# Patient Record
Sex: FEMALE | Race: WHITE | NOT HISPANIC OR LATINO | Employment: OTHER | URBAN - METROPOLITAN AREA
[De-identification: names, ages, dates, MRNs, and addresses within clinical notes are randomized per-mention and may not be internally consistent; named-entity substitution may affect disease eponyms.]

---

## 2023-04-17 LAB
ALBUMIN SERPL BCP-MCNC: 4.3 G/DL (ref 3.5–5.2)
ALP SERPL-CCNC: 76 U/L (ref 55–135)
ALT SERPL W/O P-5'-P-CCNC: 18 U/L (ref 10–44)
ANION GAP SERPL CALC-SCNC: 12 MMOL/L (ref 8–16)
AST SERPL-CCNC: 19 U/L (ref 10–40)
BASOPHILS # BLD AUTO: 0 K/UL (ref 0–0.2)
BASOPHILS NFR BLD: 0 % (ref 0–1.9)
BILIRUB SERPL-MCNC: 0.3 MG/DL (ref 0.1–1)
BNP SERPL-MCNC: 12 PG/ML (ref 0–99)
BUN SERPL-MCNC: 18 MG/DL (ref 6–20)
CALCIUM SERPL-MCNC: 9.7 MG/DL (ref 8.7–10.5)
CHLORIDE SERPL-SCNC: 108 MMOL/L (ref 95–110)
CO2 SERPL-SCNC: 22 MMOL/L (ref 23–29)
CREAT SERPL-MCNC: 0.9 MG/DL (ref 0.5–1.4)
DIFFERENTIAL METHOD: NORMAL
EOSINOPHIL # BLD AUTO: 0 K/UL (ref 0–0.5)
EOSINOPHIL NFR BLD: 0 % (ref 0–8)
ERYTHROCYTE [DISTWIDTH] IN BLOOD BY AUTOMATED COUNT: 12.6 % (ref 11.5–14.5)
EST. GFR  (NO RACE VARIABLE): >60 ML/MIN/1.73 M^2
GLUCOSE SERPL-MCNC: 105 MG/DL (ref 70–110)
HCT VFR BLD AUTO: 38.8 % (ref 37–48.5)
HGB BLD-MCNC: 13 G/DL (ref 12–16)
IMM GRANULOCYTES # BLD AUTO: 0.02 K/UL (ref 0–0.04)
IMM GRANULOCYTES NFR BLD AUTO: 0.3 % (ref 0–0.5)
LYMPHOCYTES # BLD AUTO: 2.7 K/UL (ref 1–4.8)
LYMPHOCYTES NFR BLD: 35.1 % (ref 18–48)
MCH RBC QN AUTO: 28.3 PG (ref 27–31)
MCHC RBC AUTO-ENTMCNC: 33.5 G/DL (ref 32–36)
MCV RBC AUTO: 85 FL (ref 82–98)
MONOCYTES # BLD AUTO: 0.3 K/UL (ref 0.3–1)
MONOCYTES NFR BLD: 4.3 % (ref 4–15)
NEUTROPHILS # BLD AUTO: 4.6 K/UL (ref 1.8–7.7)
NEUTROPHILS NFR BLD: 60.3 % (ref 38–73)
NRBC BLD-RTO: 0 /100 WBC
PLATELET # BLD AUTO: 273 K/UL (ref 150–450)
PMV BLD AUTO: 9.4 FL (ref 9.2–12.9)
POTASSIUM SERPL-SCNC: 3.9 MMOL/L (ref 3.5–5.1)
PROT SERPL-MCNC: 7.5 G/DL (ref 6–8.4)
RBC # BLD AUTO: 4.59 M/UL (ref 4–5.4)
SODIUM SERPL-SCNC: 142 MMOL/L (ref 136–145)
TROPONIN I SERPL DL<=0.01 NG/ML-MCNC: <0.006 NG/ML (ref 0–0.03)
WBC # BLD AUTO: 7.63 K/UL (ref 3.9–12.7)

## 2023-04-17 PROCEDURE — 93010 EKG 12-LEAD: ICD-10-PCS | Mod: ,,, | Performed by: INTERNAL MEDICINE

## 2023-04-17 PROCEDURE — 93005 ELECTROCARDIOGRAM TRACING: CPT

## 2023-04-17 PROCEDURE — 93010 ELECTROCARDIOGRAM REPORT: CPT | Mod: ,,, | Performed by: INTERNAL MEDICINE

## 2023-04-17 PROCEDURE — 84484 ASSAY OF TROPONIN QUANT: CPT | Performed by: PHYSICIAN ASSISTANT

## 2023-04-17 PROCEDURE — 99285 EMERGENCY DEPT VISIT HI MDM: CPT | Mod: 25

## 2023-04-17 PROCEDURE — 85025 COMPLETE CBC W/AUTO DIFF WBC: CPT | Performed by: PHYSICIAN ASSISTANT

## 2023-04-17 PROCEDURE — 83880 ASSAY OF NATRIURETIC PEPTIDE: CPT | Performed by: PHYSICIAN ASSISTANT

## 2023-04-17 PROCEDURE — 25000003 PHARM REV CODE 250

## 2023-04-17 PROCEDURE — 80053 COMPREHEN METABOLIC PANEL: CPT | Performed by: PHYSICIAN ASSISTANT

## 2023-04-17 RX ORDER — ASPIRIN 325 MG
TABLET ORAL
Status: COMPLETED
Start: 2023-04-17 | End: 2023-04-17

## 2023-04-17 RX ORDER — ASPIRIN 325 MG
325 TABLET ORAL
Status: COMPLETED | OUTPATIENT
Start: 2023-04-17 | End: 2023-04-17

## 2023-04-17 RX ADMIN — ASPIRIN 325 MG ORAL TABLET 325 MG: 325 PILL ORAL at 08:04

## 2023-04-17 RX ADMIN — Medication 325 MG: at 08:04

## 2023-04-18 ENCOUNTER — HOSPITAL ENCOUNTER (EMERGENCY)
Facility: HOSPITAL | Age: 33
Discharge: HOME | End: 2023-04-18
Attending: EMERGENCY MEDICINE
Payer: COMMERCIAL

## 2023-04-18 VITALS
RESPIRATION RATE: 16 BRPM | HEART RATE: 83 BPM | WEIGHT: 243.19 LBS | SYSTOLIC BLOOD PRESSURE: 111 MMHG | TEMPERATURE: 99 F | DIASTOLIC BLOOD PRESSURE: 55 MMHG | BODY MASS INDEX: 38.09 KG/M2 | OXYGEN SATURATION: 100 %

## 2023-04-18 DIAGNOSIS — R07.9 CHEST PAIN: Primary | ICD-10-CM

## 2023-04-18 LAB
D DIMER PPP IA.FEU-MCNC: 0.23 MG/L FEU
TROPONIN I SERPL DL<=0.01 NG/ML-MCNC: <0.006 NG/ML (ref 0–0.03)

## 2023-04-18 PROCEDURE — 84484 ASSAY OF TROPONIN QUANT: CPT | Performed by: PHYSICIAN ASSISTANT

## 2023-04-18 PROCEDURE — 85379 FIBRIN DEGRADATION QUANT: CPT | Performed by: EMERGENCY MEDICINE

## 2023-04-18 PROCEDURE — 25500020 PHARM REV CODE 255: Performed by: EMERGENCY MEDICINE

## 2023-04-18 RX ADMIN — IOHEXOL 100 ML: 350 INJECTION, SOLUTION INTRAVENOUS at 02:04

## 2023-04-18 NOTE — FIRST PROVIDER EVALUATION
Emergency Department TeleTriage Encounter Note      CHIEF COMPLAINT    Chief Complaint   Patient presents with    Weakness     Recently saw MD to rule out PE. Followed up with cardioligist. Wore monitor for 10 days. Stress and echo scheduled. Weak and fatigued today. Also intermittent angina.        VITAL SIGNS   Initial Vitals [04/17/23 2030]   BP Pulse Resp Temp SpO2   (!) 168/92 103 20 98.2 °F (36.8 °C) 96 %      MAP       --            ALLERGIES    Review of patient's allergies indicates:   Allergen Reactions    [place] oil Anaphylaxis    Flexeril [cyclobenzaprine] Other (See Comments)    Hydrocodone-acetaminoph-supp11 Diarrhea       PROVIDER TRIAGE NOTE  31 yo F to the ED with CP, fatigue, and lightheadedness. Appears moderately uncomfortable on exam. BP is elevated.       ORDERS  Labs Reviewed   CBC W/ AUTO DIFFERENTIAL   COMPREHENSIVE METABOLIC PANEL   TROPONIN I   B-TYPE NATRIURETIC PEPTIDE       ED Orders (720h ago, onward)      Start Ordered     Status Ordering Provider    04/17/23 2346 04/17/23 2045  Troponin I #2  Once Timed         Ordered JUDY MANRIQUE    04/17/23 2100 04/17/23 2045  aspirin tablet 325 mg  ED 1 Time         Ordered JUDY MANRIQUE    04/17/23 2046 04/17/23 2045  Vital signs  Every 15 min         Ordered JUDY MANRIQUE    04/17/23 2046 04/17/23 2045  Cardiac Monitoring - Adult  Continuous        Comments: Notify Physician If:    Ordered JUDY MANRIQUE    04/17/23 2046 04/17/23 2045  Pulse Oximetry Continuous  Continuous         Ordered JUDY MANRIQUE    04/17/23 2046 04/17/23 2045  Diet NPO  Diet effective now         Ordered JUDY MANRIQUE    04/17/23 2046 04/17/23 2045  Saline lock IV  Once         Ordered JUDY AMNRIQUE    04/17/23 2046 04/17/23 2045  EKG 12-lead  Once        Comments: Do not perform if previously done during this visit/ triage    Ordered JUDY MANRIQUE    04/17/23 2046 04/17/23 2045  CBC auto differential  STAT         Ordered JUDY MANRIQUE  T.    04/17/23 2046 04/17/23 2045  Comprehensive metabolic panel  STAT         Ordered JUDY MANRIQUE T.    04/17/23 2046 04/17/23 2045  Troponin I #1  STAT         Ordered JUDY MANRIQUE T.    04/17/23 2046 04/17/23 2045  BNP  STAT         Ordered JUDY MANRIQUE.    04/17/23 2046 04/17/23 2045  X-Ray Chest AP Portable  1 time imaging         Ordered JUDY MANRIQUE.    04/17/23 2036 04/17/23 2036  EKG 12-lead (Chest Pain) Age >30  Once        Comments: If patient > 30 yrs.    Completed by MODESTA MAZARIEGOS on 4/17/2023 at  8:36 PM MARCELLE VEGA              Virtual Visit Note: The provider triage portion of this emergency department evaluation and documentation was performed via [employer], a HIPAA-compliant telemedicine application, in concert with a tele-presenter in the room. A face to face patient evaluation with one of my colleagues will occur once the patient is placed in an emergency department room.      DISCLAIMER: This note was prepared with [employer] voice recognition transcription software. Garbled syntax, mangled pronouns, and other bizarre constructions may be attributed to that software system.

## 2023-04-18 NOTE — ED PROVIDER NOTES
SCRIBE #1 NOTE: IVincent, am scribing for, and in the presence of, Aubrey Angelo MD. I have scribed the HPI, ROS, PE.    SCRIBE #2 NOTE: I, Theron Benson, am scribing for, and in the presence of,  Danny Hall Jr., MD. I have scribed the remaining portions of the note not scribed by Scribe #1.    History     Chief Complaint   Patient presents with    Weakness     Recently saw MD to rule out PE. Followed up with cardioligist. Wore monitor for 10 days. Stress and echo scheduled. Weak and fatigued today. Also intermittent angina.      Review of patient's allergies indicates:   Allergen Reactions    [place] oil Anaphylaxis    Flexeril [cyclobenzaprine] Other (See Comments)    Hydrocodone-acetaminoph-supp11 Diarrhea         History of Present Illness     HPI    4/18/2023, 1:42 AM  History obtained from the patient      History of Present Illness: Dhara Amezcua is a 32 y.o. female patient with a PMHx of angina, Primary hypercoagulable state who presents to the Emergency Department for evaluation of SOB which onset a month ago. Pt has been experiencing chest pain in addition to the SOB this past month. Today her symptoms began at 1 pm and heightened in severity at 7 pm before finally relieving prior to arrival to the ED. Pt describes feeling as if they were going to feint two times today as well as felt like she was unable to finish sentences without losing breath. Symptoms are intermittent and moderate in severity. No mitigating or exacerbating factors reported. Associated sxs include dizziness. Patient denies any, and all other sxs at this time. No prior Tx reported. Pt states they are not taking blood thinners. No further complaints or concerns at this time.       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; no h/o DVTs/PEs; strong family h/o DVTs; w/u resulted in MTFHR and prothrombin gene mutation    Gout     Hearing loss     Hypothyroidism     Hypothyroidism-2014    Myopia     Myopia of both eyes    Osteoarthrosis, unspecified whether generalized or localized     Other chronic nonalcoholic liver disease     :Fatty liver disease, nonalcoholic-2014 - seen on u/s; rhem in [place] reports that there was hepatocellular disease; hepatitis w/u is wnl    Polycystic ovaries     Primary hypercoagulable state     Prothrombin Gene Mutation-2017; no h/o DVTs/PEs; strong family h/o DVTs; w/u resulted in MTFHR and prothrombin gene mutation    Rheumatoid arthritis     Rheumatoid arthritis flare    Sicca syndrome     Sjogren's syndrome-2017       Past Surgical History:  Past Surgical History:   Procedure Laterality Date    BUNIONECTOMY      ELBOW SURGERY      KNEE SURGERY      SINUS SURGERY      WISDOM TOOTH EXTRACTION Bilateral          Family History:  No family history on file.    Social History:  Social History     Tobacco Use    Smoking status: Never    Smokeless tobacco: Never   Substance and Sexual Activity    Alcohol use: Not on file    Drug use: Not on file    Sexual activity: Not on file        Review of Systems     Review of Systems   Constitutional:  Negative for fever.   HENT:  Negative for sore throat.    Respiratory:  Positive for shortness of breath.    Cardiovascular:  Positive for chest pain.   Gastrointestinal:  Negative for nausea.   Genitourinary:  Negative for dysuria.   Musculoskeletal:  Negative for back pain.   Skin:  Negative for rash.   Neurological:  Positive for dizziness. Negative for weakness.   Hematological:  Does not bruise/bleed easily.   All other  systems reviewed and are negative.     Physical Exam     Initial Vitals [04/17/23 2030]   BP Pulse Resp Temp SpO2   (!) 168/92 103 20 98.2 °F (36.8 °C) 96 %      MAP       --          Physical Exam  Nursing Notes and Vital Signs Reviewed.  Constitutional: Patient is in no acute distress. Well-developed and well-nourished.  Head: Atraumatic. Normocephalic.  Eyes: PERRL. EOM intact. Conjunctivae are not pale. No scleral icterus.  ENT: Mucous membranes are moist. Oropharynx is clear and symmetric.    Neck: Supple. Full ROM. No lymphadenopathy.  Cardiovascular: Regular rate. Regular rhythm. No murmurs, rubs, or gallops. Distal pulses are 2+ and symmetric.  Pulmonary/Chest: No respiratory distress. Clear to auscultation bilaterally. No wheezing or rales.  Abdominal: Soft and non-distended.  There is no tenderness.  No rebound, guarding, or rigidity. Good bowel sounds.  Genitourinary: No CVA tenderness  Musculoskeletal: Moves all extremities. No obvious deformities. No edema. No calf tenderness.  Skin: Warm and dry.  Neurological:  Alert, awake, and appropriate.  Normal speech.  No acute focal neurological deficits are appreciated.  Psychiatric: Normal affect. Good eye contact. Appropriate in content.     ED Course   Procedures  ED Vital Signs:  Vitals:    04/17/23 2030 04/18/23 0131 04/18/23 0151 04/18/23 0230   BP: (!) 168/92 (!) 142/80 (!) 150/68 (!) 147/64   Pulse: 103 91 88 89   Resp: 20 16 12 18   Temp: 98.2 °F (36.8 °C)      TempSrc: Oral      SpO2: 96% 100% 100% 100%   Weight: 110.3 kg (243 lb 2.7 oz)       04/18/23 0330 04/18/23 0429   BP: (!) 116/58 (!) 111/55   Pulse: 83 83   Resp: 17 16   Temp:  98.5 °F (36.9 °C)   TempSrc:     SpO2: 97% 100%   Weight:         Abnormal Lab Results:  Labs Reviewed   COMPREHENSIVE METABOLIC PANEL - Abnormal; Notable for the following components:       Result Value    CO2 22 (*)     All other components within normal limits   CBC W/ AUTO DIFFERENTIAL   TROPONIN I   B-TYPE  NATRIURETIC PEPTIDE   TROPONIN I   D DIMER, QUANTITATIVE        All Lab Results:  Results for orders placed or performed during the hospital encounter of 04/18/23   CBC auto differential   Result Value Ref Range    WBC 7.63 3.90 - 12.70 K/uL    RBC 4.59 4.00 - 5.40 M/uL    Hemoglobin 13.0 12.0 - 16.0 g/dL    Hematocrit 38.8 37.0 - 48.5 %    MCV 85 82 - 98 fL    MCH 28.3 27.0 - 31.0 pg    MCHC 33.5 32.0 - 36.0 g/dL    RDW 12.6 11.5 - 14.5 %    Platelets 273 150 - 450 K/uL    MPV 9.4 9.2 - 12.9 fL    Immature Granulocytes 0.3 0.0 - 0.5 %    Gran # (ANC) 4.6 1.8 - 7.7 K/uL    Immature Grans (Abs) 0.02 0.00 - 0.04 K/uL    Lymph # 2.7 1.0 - 4.8 K/uL    Mono # 0.3 0.3 - 1.0 K/uL    Eos # 0.0 0.0 - 0.5 K/uL    Baso # 0.00 0.00 - 0.20 K/uL    nRBC 0 0 /100 WBC    Gran % 60.3 38.0 - 73.0 %    Lymph % 35.1 18.0 - 48.0 %    Mono % 4.3 4.0 - 15.0 %    Eosinophil % 0.0 0.0 - 8.0 %    Basophil % 0.0 0.0 - 1.9 %    Differential Method Automated    Comprehensive metabolic panel   Result Value Ref Range    Sodium 142 136 - 145 mmol/L    Potassium 3.9 3.5 - 5.1 mmol/L    Chloride 108 95 - 110 mmol/L    CO2 22 (L) 23 - 29 mmol/L    Glucose 105 70 - 110 mg/dL    BUN 18 6 - 20 mg/dL    Creatinine 0.9 0.5 - 1.4 mg/dL    Calcium 9.7 8.7 - 10.5 mg/dL    Total Protein 7.5 6.0 - 8.4 g/dL    Albumin 4.3 3.5 - 5.2 g/dL    Total Bilirubin 0.3 0.1 - 1.0 mg/dL    Alkaline Phosphatase 76 55 - 135 U/L    AST 19 10 - 40 U/L    ALT 18 10 - 44 U/L    Anion Gap 12 8 - 16 mmol/L    eGFR >60 >60 mL/min/1.73 m^2   Troponin I #1   Result Value Ref Range    Troponin I <0.006 0.000 - 0.026 ng/mL   BNP   Result Value Ref Range    BNP 12 0 - 99 pg/mL   Troponin I #2   Result Value Ref Range    Troponin I <0.006 0.000 - 0.026 ng/mL   D dimer, quantitative   Result Value Ref Range    D-Dimer 0.23 <0.50 mg/L FEU       Imaging Results:  Imaging Results              CTA Chest Non-Coronary (PE Studies) (In process)                      X-Ray Chest AP Portable  (Preliminary result)  Result time 04/18/23 01:35:14      Wet Read by Aubrey Angelo MD (04/18/23 01:35:14, [facility] - Emergency Dept., Emergency Medicine)    No acute fidnigns                                 4:14 AM: Per [facility] interpretation of CT angiography with IV contrast: Normal Chest CTA. No pulmonary embolism    The EKG was ordered, reviewed, and independently interpreted by the ED provider.  Interpretation time: 20:33  Rate: 95 BPM  Rhythm:  Sinus rhythm with short SD  Interpretation: No acute ST changes. No STEMI.  .           The Emergency Provider reviewed the vital signs and test results, which are outlined above.     ED Discussion     2:00 AM: Dr. Angelo transfers care of patient to Dr. Hall pending results.    4:15 AM: Reassessed pt at this time.   Discussed with pt all pertinent ED information and results. Discussed pt dx and plan of tx. Gave pt all f/u and return to the ED instructions. All questions and concerns were addressed at this time. Pt expresses understanding of information and instructions, and is comfortable with plan to discharge. Pt is stable for discharge.    I discussed with patient and/or family/caretaker that evaluation in the ED does not suggest any emergent or life threatening medical conditions requiring immediate intervention beyond what was provided in the ED, and I believe patient is safe for discharge.  Regardless, an unremarkable evaluation in the ED does not preclude the development or presence of a serious of life threatening condition. As such, patient was instructed to return immediately for any worsening or change in current symptoms.       Medical Decision Making:   History:   Old Medical Records: I decided to obtain old medical records.  Old Records Summarized: records from clinic visits and records from previous admission(s).       <> Summary of Records: Reviewed the patient's prior evaluation for chest pain.  Initial Assessment:   Patient presents with chest pain  history of clotting disorder.  She is tachycardic on arrival with complaints of chest heaviness.  Differential Diagnosis:   PE, ASCVD, NSTEMI, STEMI, chest pain, pneumonia  Clinical Tests:   Lab Tests: Ordered and Reviewed  Radiological Study: Ordered and Reviewed  Medical Tests: Ordered and Reviewed  ED Management:  Patient was evaluated history physical examination.  EKG was performed independently interpreted as normal sinus rhythm without STEMI.  Blood work was obtained with x2 negative cardiac enzymes.  She would a mild decrease in her CO2 at 22.  D-dimer was normal as was BNP and her CBC.  Chest x-ray was obtained which was read read as no acute findings.  This was a independent interpretation.  CTA was performed read by stat read.  No PE.  I discussed all findings with the patient as well as the plan of care.  Patient is stable safe for discharge.  Does not appear to be cardiac or a PE.  She feels much better with this appears to be mostly anxious.  Her pulses come down after learning of the negative CT angio.  Patient is stable safe for discharge in my opinion.  Advised close follow-up with primary care provider.  She verbalized agreement understanding with all instructions seems reliable.  She is safe for discharge in my opinion.         ED Medication(s):  Medications   aspirin tablet 325 mg (325 mg Oral Given 4/17/23 2055)   iohexoL (OMNIPAQUE 350) injection 100 mL (100 mLs Intravenous Given 4/18/23 0229)       Discharge Medication List as of 4/18/2023  4:17 AM           Follow-up Information       [facility].    Specialty: Nurse Practitioner  Contact information:  [address] [facility] [address]  [phone]                                 Scribe Attestation:   Scribe #1: I performed the above scribed service and the documentation accurately describes the services I performed. I attest to the accuracy of the note.     Attending:   Physician Attestation Statement for Scribe #1: I,  Aubrey Angelo MD, personally performed the services described in this documentation, as scribed by Vincent Varner, in my presence, and it is both accurate and complete.       Scribe Attestation:   Scribe #2: I performed the above scribed service and the documentation accurately describes the services I performed. I attest to the accuracy of the note.    Attending Attestation:           Physician Attestation for Scribe:    Physician Attestation Statement for Scribe #2: I, Danny Hall Jr., MD, reviewed documentation, as scribed by Theron Benson in my presence, and it is both accurate and complete. I also acknowledge and confirm the content of the note done by Scribe #1.         Clinical Impression       ICD-10-CM ICD-9-CM   1. Chest pain  R07.9 786.50             Danny Hall Jr., MD  04/18/23 0444

## 2023-04-18 NOTE — DISCHARGE INSTRUCTIONS
Your blood work and EKG did not show any damage to your heart.  Your CT angiogram chest is also negative.  Continue all home medications.  Use ibuprofen for pain and follow up with her doctor 1-2 days for re-.  Return as needed for any worsening symptoms, problems, questions concerns for evaluation

## 2024-02-08 NOTE — PROGRESS NOTES
Date of Service: 2/14/2024    Chief Complaint:   Dhara Amezcua is a 33 y.o. female presenting today due to abnormal findings on recent rightmammogram.    History of Present Illness:   Mrs. Dhara Amezcua presents on 02/14/24 due to abnormal findings (asymmetry) on her recent mammogram. Additional imaging has been recommended.  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; no h/o DVTs/PEs; strong family h/o DVTs; w/u resulted in MTFHR and prothrombin gene mutation    Family history of breast cancer 02/14/2024    Gout     Hearing loss     Hypothyroidism     Hypothyroidism-2014    Myopia     Myopia of both eyes    Osteoarthrosis, unspecified whether generalized or localized     Other chronic nonalcoholic liver disease     :Fatty liver disease, nonalcoholic-2014 - seen on u/s; rhem in [place] reports that there was hepatocellular disease; hepatitis w/u is wnl    Polycystic ovaries     Primary hypercoagulable state     Prothrombin Gene Mutation-2017; no h/o DVTs/PEs; strong family h/o DVTs; w/u resulted in MTFHR and prothrombin gene mutation    Rheumatoid arthritis     Rheumatoid arthritis flare    Sicca syndrome     Sjogren's syndrome-2017      Past Surgical History:   Procedure Laterality Date    BUNIONECTOMY      ELBOW SURGERY      KNEE SURGERY      SINUS SURGERY      WISDOM TOOTH EXTRACTION Bilateral         Current Outpatient Medications:     ALPRAZolam (XANAX) 0.5 MG tablet, Take 0.5 mg by mouth daily as needed for Anxiety., Disp: , Rfl:     azaTHIOprine (IMURAN) 50  mg Tab, Take 25 mg by mouth 2 (two) times a day., Disp: , Rfl:     busPIRone (BUSPAR) 15 MG tablet, Take 15 mg by mouth 2 (two) times daily., Disp: , Rfl:     dextroamphetamine-amphetamine (ADDERALL XR) 20 MG 24 hr capsule, Take 20 mg by mouth every morning., Disp: , Rfl:     hyoscyamine sulfate (HYOSYNE ORAL), Take by mouth., Disp: , Rfl:     lisdexamfetamine (VYVANSE) 70 MG capsule, Take 70 mg by mouth every morning., Disp: , Rfl:     medroxyPROGESTERone (PROVERA) 10 MG tablet, Take 1 tablet (10 mg total) by mouth once daily. Take 1 pill po daily x 10 days each month for 3 months. for 10 days, Disp: 10 tablet, Rfl: 2    vitamin D (VITAMIN D3) 1000 units Tab, Take 1,000 Units by mouth once daily., Disp: , Rfl:     vortioxetine (TRINTELLIX) 10 mg Tab, Take 10 mg by mouth., Disp: , Rfl:    Review of patient's allergies indicates:   Allergen Reactions    Codeine Diarrhea, Nausea And Vomiting, Other (See Comments), Palpitations, Shortness Of Breath and Tinitus    [place] oil Anaphylaxis    Acetaminophen      Other Reaction(s): sold sweats    Flexeril [cyclobenzaprine] Other (See Comments)    Hydrocodone-acetaminoph-supp11 Diarrhea      Social History     Tobacco Use    Smoking status: Never    Smokeless tobacco: Never   Substance Use Topics    Alcohol use: Not on file      Family History   Problem Relation Age of Onset    Breast cancer Mother         Review of Systems   Integumentary:  Negative for rash, redness, and swelling/thickening  Breast:   Negative for lump/mass in right breast     Physical Exam   Constitutional: She appears well-developed. She is cooperative.   HENT: Normocephalic.   Cardiovascular:  Normal rate and regular rhythm.            Pulmonary/Chest: She exhibits no tenderness and no bony tenderness.   Abdominal: Soft. Normal appearance.   Musculoskeletal: Intact; no deficits  Neurological: She is alert.   Skin: No rash noted.   Breast and Chest Wall Evaluation:  Right breast exhibits no mass, no  nipple discharge, no skin change and no tenderness.    Left breast exhibits no mass, no nipple discharge, no skin change and no tenderness.     Lymphadenopathy: No supraclavicular or axillary adenopathy.    MAMMOGRAM REPORT: 2/12/2024: near complete resolution of asymmetry with spot views; 1/31/24 Focal asymmetry of the lower right breast for which further mammographic evaluation to include true lateral and spot compression views are warranted.     ULTRASOUND REPORT: nothing suspicious seen    NOTE:::We viewed her films together at today's visit.  We discussed the multiple views obtained and the important findings.  Even benign changes were mentioned and her questions were answered.  She knows that she may receive a formal letter or report from the Radiologist.  She is to contact us if she has questions.    ASSESSMENT and PLAN OF CARE     1. Abnormality of right breast on screening mammogram  Assessment & Plan:  We discussed her right breast asymmetry at length. It almost completely resolved with spot views and was not seen with US.  She should require no further imaging.      2. Family history of breast cancer  Assessment & Plan:  We discussed her family history of length.  Her mother was over the age of 50 with her diagnosis.  Dhara knows that her risk of having a genetic mutation is only half that of her mother's.  Her mother's risk at this point is 2.2% and Dhara's is only 1%.  I would favor the mother being tested first and only testing Bita if her mother's  test comes back positive.  She agrees.      Medical Decision Making: It is my impression that this patient suffers all conditions contained in this medical document.  Each of these conditions did affect our plan of care and my medical decision making today.  It is my opinion that the medical decision making concerning this patient was of moderate difficulty based on the aforementioned conditions.  Any further recommendations will be  communicated to the patient by me.  I have reviewed and verified her allergies, list of medications, medical and surgical histories, social history, and a pertinent review of symptoms.     Follow up:  1 year and prn    For:  Physical Examination and MGM (D) at

## 2024-02-09 PROBLEM — R92.8 ABNORMALITY OF RIGHT BREAST ON SCREENING MAMMOGRAM: Status: ACTIVE | Noted: 2024-02-09

## 2024-02-09 NOTE — ASSESSMENT & PLAN NOTE
We discussed her right breast asymmetry at length. It almost completely resolved with spot views and was not seen with US.  She should require no further imaging.

## 2024-02-14 ENCOUNTER — OFFICE VISIT (OUTPATIENT)
Dept: SURGERY | Facility: CLINIC | Age: 34
End: 2024-02-14
Payer: COMMERCIAL

## 2024-02-14 DIAGNOSIS — Z80.3 FAMILY HISTORY OF BREAST CANCER: ICD-10-CM

## 2024-02-14 DIAGNOSIS — R92.8 ABNORMALITY OF RIGHT BREAST ON SCREENING MAMMOGRAM: Primary | ICD-10-CM

## 2024-02-14 PROCEDURE — 99999 PR PBB SHADOW E&M-EST. PATIENT-LVL III: CPT | Mod: PBBFAC,,, | Performed by: SPECIALIST

## 2024-02-14 PROCEDURE — 1159F MED LIST DOCD IN RCRD: CPT | Mod: CPTII,S$GLB,, | Performed by: SPECIALIST

## 2024-02-14 PROCEDURE — 99203 OFFICE O/P NEW LOW 30 MIN: CPT | Mod: S$GLB,,, | Performed by: SPECIALIST

## 2024-02-14 PROCEDURE — 1160F RVW MEDS BY RX/DR IN RCRD: CPT | Mod: CPTII,S$GLB,, | Performed by: SPECIALIST

## 2024-02-14 NOTE — ASSESSMENT & PLAN NOTE
We discussed her family history of length.  Her mother was over the age of 50 with her diagnosis.  Dhara knows that her risk of having a genetic mutation is only half that of her mother's.  Her mother's risk at this point is 2.2% and Dhara's is only 1%.  I would favor the mother being tested first and only testing Bita if her mother's  test comes back positive.  She agrees.

## 2024-04-16 ENCOUNTER — CLINICAL SUPPORT (OUTPATIENT)
Dept: AUDIOLOGY | Facility: CLINIC | Age: 34
End: 2024-04-16
Payer: COMMERCIAL

## 2024-04-16 ENCOUNTER — OFFICE VISIT (OUTPATIENT)
Dept: OTOLARYNGOLOGY | Facility: CLINIC | Age: 34
End: 2024-04-16
Payer: COMMERCIAL

## 2024-04-16 VITALS — BODY MASS INDEX: 39.64 KG/M2 | WEIGHT: 253.06 LBS

## 2024-04-16 DIAGNOSIS — H81.01 MENIERE DISEASE, RIGHT: Primary | ICD-10-CM

## 2024-04-16 PROCEDURE — 99204 OFFICE O/P NEW MOD 45 MIN: CPT | Mod: S$GLB,,, | Performed by: STUDENT IN AN ORGANIZED HEALTH CARE EDUCATION/TRAINING PROGRAM

## 2024-04-16 PROCEDURE — 99999 PR PBB SHADOW E&M-EST. PATIENT-LVL III: CPT | Mod: PBBFAC,,, | Performed by: STUDENT IN AN ORGANIZED HEALTH CARE EDUCATION/TRAINING PROGRAM

## 2024-04-16 PROCEDURE — 92567 TYMPANOMETRY: CPT | Mod: S$GLB,,, | Performed by: AUDIOLOGIST-HEARING AID FITTER

## 2024-04-16 PROCEDURE — 99999 PR PBB SHADOW E&M-EST. PATIENT-LVL I: CPT | Mod: PBBFAC,,, | Performed by: AUDIOLOGIST-HEARING AID FITTER

## 2024-04-16 PROCEDURE — 3008F BODY MASS INDEX DOCD: CPT | Mod: CPTII,S$GLB,, | Performed by: STUDENT IN AN ORGANIZED HEALTH CARE EDUCATION/TRAINING PROGRAM

## 2024-04-16 PROCEDURE — 1159F MED LIST DOCD IN RCRD: CPT | Mod: CPTII,S$GLB,, | Performed by: STUDENT IN AN ORGANIZED HEALTH CARE EDUCATION/TRAINING PROGRAM

## 2024-04-16 PROCEDURE — 92557 COMPREHENSIVE HEARING TEST: CPT | Mod: S$GLB,,, | Performed by: AUDIOLOGIST-HEARING AID FITTER

## 2024-04-16 NOTE — PROGRESS NOTES
Referring provider: Dr. Peter Amezcua was seen 04/16/2024 for an audiological evaluation.  Patient complains of episodic dizziness with associated decreased hearing, tinnitus and aural fullness in her right ear. In 2015 she had sudden right hearing loss, right-vision loss and right-sided facial numbness sensation. She had a negative MRI for vestibular schwannoma and was told likely viral. Symptoms self-resolved except that her hearing did not fully recover for the right ear. An audiogram in 2019 with [facility] indicated right hearing loss, describing around a flat 60-dB loss. A recent audiogram with [facility] indicated normal hearing AU. She wore Cristin in the right ear for 1-year when her hearing was poor, with [facility]. Patient has concern for Meniere's. She reports quarterly episodes of postural vertigo lasting for minutes that persists for about 10-15 days. She appreciates fluctuations in hearing and fluctuating aural fullness for her right ear. She endorses fluctuating tinnitus AD: non-pulsatile high-pitched ringing. No vestibular testing. She also has history of migraine with long history of motion-sickness. She had sinus surgery that helped with her migraines. She also got fitted with prism in her glasses that has notably improved her motion-sickness and migraine. Was on topomax but able to stop since getting prisms. Over the past one year, she made diet changes to eliminating processed foods, limits salt and caffeine, and switched to natural products. Overall symptoms have been better since her lifestyle change. Not on diuretic.     Results reveal normal hearing sensitivity 125-8000 Hz for the right ear, and normal hearing sensitivity 125-8000 Hz for the left ear.   Speech Reception Thresholds were 10 dBHL for the right ear and 10 dBHL for the left ear.   Word recognition scores were excellent for the right ear and excellent for the left ear.   Tympanograms were Type A for the right ear  and Type A for the left ear.    Patient was counseled on the above findings.    Recommendations:  ENT: Her symptoms are most consistent with meniere's disease. We discussed continued hydrops diet and testing with VNG/Ecog during next acute episode to establish the diagnosis. We did discuss abortive and maintenance treatment options if episodes become more severe or frequent despite hydrops diet.

## 2024-04-16 NOTE — PROGRESS NOTES
Chief complaint:   Chief Complaint   Patient presents with    Dizziness     10-15 days at a time for the last year + - is very postural in nature - has been increasing    Sinus Problem     History of sinus surgery - +PND    Hearing Loss     Right ear - seems to fluctuate        Referring Provider:  Self, Aaareferral  No address on file    History of Present Illness:     Ms. Amezcua is a 33 y.o. female presenting for evaluation of dizziness.      Patient complains of 2015 right hearing, vision loss, and unusual facial sensation. NL MRI at that time. Self-resolved except hearing did not fully recover.     Since then she has had some recurrent episodes of hearing, ear pressure, and dizziness (room spinning with turning to the right). Symptoms usually worse in the right ear.  Typically will last for 2 weeks. Have happened 3-4  times per year over thel ast 1.5 years. Associated non-pulsatile high-pitched ringing AD. No improvement with Epley. Last severe episode was     Has improved over last year with switching to more natural diet and limiting salt and caffeine.    2019 [facility] audio - right poorer than left. Recent audio [facility] - NL AU. Concern for Meniere's.      Prism in glasses - long history of motion-sickness and vertigo has notably improved with prism. Has migraine history. As on topomax but able to stop since getting prisms. Migraines have also improved.     Still has some random episodes of tingling and burning in the right face.     Chest tightness and SOB about 1 year - normal CV workup. Started better diet around that time.        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; no h/o DVTs/PEs; strong family h/o DVTs; w/u resulted in MTFHR and prothrombin gene mutation    Family history of breast cancer 02/14/2024    Gout     Hearing loss     Hypothyroidism     Hypothyroidism-2014    Myopia     Myopia of both eyes    Osteoarthrosis, unspecified whether generalized or localized     Other chronic nonalcoholic liver disease     :Fatty liver disease, nonalcoholic-2014 - seen on u/s; rhem in [place] reports that there was hepatocellular disease; hepatitis w/u is wnl    Polycystic ovaries     Primary hypercoagulable state     Prothrombin Gene Mutation-2017; no h/o DVTs/PEs; strong family h/o DVTs; w/u resulted in MTFHR and prothrombin gene mutation    Rheumatoid arthritis     Rheumatoid arthritis flare    Sicca syndrome     Sjogren's syndrome-2017    .          Past Surgical History:  Past Surgical History:   Procedure Laterality Date    BUNIONECTOMY      ELBOW SURGERY      KNEE SURGERY      SINUS SURGERY      WISDOM TOOTH EXTRACTION Bilateral    .         Medications: Medication list was reviewed. She  has a current medication list which includes the following prescription(s): alprazolam, azathioprine, buspirone, hyoscyamine sulfate, vitamin d, dextroamphetamine-amphetamine, lisdexamfetamine, medroxyprogesterone, and trintellix.         Allergies:   Review of patient's allergies indicates:   Allergen Reactions    Codeine Diarrhea, Nausea And Vomiting, Other (See Comments), Palpitations, Shortness Of Breath and Tinitus    McLean oil Anaphylaxis    Acetaminophen      Other Reaction(s): sold sweats    Flexeril [cyclobenzaprine] Other (See Comments)    Hydrocodone-acetaminoph-supp11 Diarrhea            Family history: family history includes Breast cancer in her mother.         Social History          Alcohol use:  has no history on file for alcohol use.            Tobacco:  reports that  she has never smoked. She has never used smokeless tobacco.         Please see the patient's intake form for full details of past medical history, past surgical history, family history, social history and review of systems. ?This information was reviewed by me and noted.      Physical Examination     There were no vitals filed for this visit.     General: Well developed, well nourished, well hydrated. Verbal with a strong voice and not dysphonic.     Head/Face: Normocephalic, atraumatic. No scars or lesions. Facial musculature equal.     Eyes: No scleral icterus or conjunctival hemorrhage. EOMI. PERRLA.     Ears:     Right ear: No gross deformity. EAC is clear of debris and erythema. The TM is intact with a pneumatized middle ear. No signs of retraction, fluid or infection.      Left ear: No gross deformity. EAC is clear of debris and erythema. The TM is intact with a pneumatized middle ear. No signs of retraction, fluid or infection.     Neurologic: Moving all extremities without gross abnormality.CN II-XII grossly intact. House-Brackmann 1/6.     Motor strength:  Strength 5/5 throughout.    Sensation:  Sensory function to light touch and proprioception intact throughout.    Gait:  No ataxia and can tandem gait 6 steps.    Romberg test:  No abnormal sway or falls with eyes open and closed.     Finger-to-nose testing intact without dysmetria.    Nystagmus - none present with left and right lateral gaze      Data review    Audiogram     Audiogram was independently reviewed        Assessment     1. Meniere disease, right          Plan:      Dizziness is an extremely complex problem that may arise from many sources.  I requires the coordination between the visual system, the vestibular system as well as the proprioreceptive system.  Additionally, balance is compromised in the setting of musculoskeletal, cerebral, cardiac, and numerous physiologic disorders.  The complex interplay between these systems may also lead to  dizziness if there is dysynchrony between the bilateral vestibular symptoms.    Central vestibular symptoms can generally be distinguished from peripheral vestibulopathies by the presence of other non vestibular neurologic symptoms (focal weakness, headache), light headedness (rather than true vertigo), near syncope, weak limbs, panic, fuzziness/cloudiness in mentation, and clumsiness.  Peripheral vestibulopathies are generally, at some point during their course, characterized by a true vertiginous sensation of movement, difficulty with sudden head movements, nausea, difficulty with sudden head movement, and possibly oscicllopsia.    BPPV is the most common cause of episodic vertigo.  Symptoms generally last for seconds then resolve when motionless, otitic symptoms are absent and may be provoked with sudden head motion.  This may occur spontaneously, but frequently follow head trauma or recent vestibular neuronitis.  Nystagmus is typically geotropic and directed toward the affected ear (hyperactive input to the inferior vestibular nerve via the Singular nerve). Canalith repositioning maneuvers are the method of choice for treating this condition.  Mild imbalance following is common and may last 1-2 weeks.    Vestibular neuronitis and labyrinthitis can be distinguished by the presence of sensorineural hearing loss in labyrinthitis, but during their active phase, vertigo is constant.   MRI may be necessary during this phase to exclude CNS pathology.  Frequently this is preceded by a viral illness. Both result in permanent partial end organ weakness of the affected vestibular nerve (usually superior).  Otherwise, they are essentially the same.  The early (vertiginous, 1-3 days) phase is characterized by acute onset of vertigo that is essentially constant and present even in the absence of motion.  Nystagmus is directed away from the affected ear (hypoactive).  In the acute phase, steroids, limited use of vestibular  suppressants and hydration are the most effective treatment. Patients then enter the second phase of uncompensated vestibulopathy where they have a general sense of imbalance.  The duration of this phase depends on several factors, but is generally delayed in the presence of vestibular suppressants, advanced age, central/systemic balance issues and sessile behavior.   Phase 3 is compensated vestibulopathy where symptoms generally only occur with sudden head movements and can be seen with catch up saccades on head thrust.   However, in the presence of bilateral VN, oscillopsia is the hallmark of the disease and compensation is frequently very delayed and poor.    Other causes of vertigo that are typically constant are vestibulotoxic medications and autoimmune inner ear disease and these are generally bilateral in nature.    Meniere's disease is typically (85%) unilateral and defined by 2 spontaneous vertigo attacks lasting 20 min or more, documented hearing loss (typically low tone, frequently fluctuating) and otic fullness or tinnitus in the affected ear. However, the presence of endolymphatic hydrops may result in similar symptoms, not meeting these strict criteria.  This disease can be difficult to distinguish from vestibular migraines, which are not always associated with headaches.    Superior canal dehiscence presents with episodic vertigo lasting seconds to minutes, aural fullness, autophony, hyperacusis and tinnitus (gerardo pulsatile).  Aural pressure is the most common presenting symptom.  Symptoms can be provoked with Valsalva.  Bone conduction thresholds are supranormal.    Perilymph fistula presents with fluctuating hearing loss, episodic vertigo lasting seconds to minutes and frequently is associated with prior barotrauma or otologic surgery.  Conservative measures such as stool softeners and bedrest frequently lead to resolution.  In cases of persistent symptoms, unfortunately there is no noninvasive  diagnostic test with high specificity, and surgical exploration is sometimes necessary when this is expected.    Vestibular schwannomas may have constant or episodic vertigo, frequently SNHL and sometimes may be accompanied by headache or facial numbness.    The diagnosis and options of management were discussed at length with the patient, including hearing, balance function and the risks associated with my recommendations. We spent a considerable amount of time discussing strategies to cope with dizziness. I emphasized the great importance of fall avoidance and activities that may be dangerous if Dhara has an episode of dizziness.  I answered all questions in layman's terms. Based on the history, physical exam and imaging studies there is significant evidence of a vestibular dysfunction. Her symptoms are most consistent with meniere's disease. We discussed continued hydrops diet and testing with VNG/Ecog during next acute episode to establish the diagnosis. We did discuss abortive and maintenance treatment options if episodes become more severe or frequent despite hydrops diet.        Yinka Green MD  [facility]   [facility]  [id number] [place] [address].  ED[place]ERT[place] Benavides [id number]  P: [phone]  F: [phone]

## 2024-04-16 NOTE — PATIENT INSTRUCTIONS
Diet for Meniere's Disease   The fluid-filled hearing and balance structures of the inner ear normally function independent of the bodys overall fluid/blood system. In a normal inner ear, the fluid is maintained at a constant volume and contains specific concentrations of sodium, potassium, chloride and other electrolytes. This fluid bathes the sensory cells of the inner ear and allows them to function normally.    With injury or degeneration of the inner ear structures the volume and concentration of the inner ear fluid fluctuates with changes in the bodys fluid/blood. This fluctuation causes the symptoms of hydrops--pressure or fullness in the ears, tinnitus (ringing in the ears), hearing loss, dizziness and imbalance.    When you eat foods that are high in salt or sugar, your blood level concentration of salt or sugar increases, and this, in turn, will affect the concentration of substances in your inner ear.    People with certain balance disorders must control the amount of salt and sugar that is added to food. You must also become aware of the hidden salts and sugars that foods contain. Limiting or eliminating your use of caffeine and alcohol will also help to reduce symptoms of dizziness and ringing in the ears.    The goal of treatment is to provide stable body fluid/blood levels so that secondary fluctuations in the inner ear fluid can be avoided.    Dietary Goals:  1. Distribute your food and fluid intake evenly throughout the day and from day to day. Eat approximately the same amount of food at each meal and do not skip meals. If you eat snacks, have them at regular times.  2. Avoid eating foods or fluids which have a high salt or sugar content. High salt or sugar levels in the diet result in fluctuations in the inner ear fluid pressure, and may increase your symptoms. Aim for a diet high in fresh fruits, vegetables and whole grains, and low in canned, frozen or processed foods. A 2-gram sodium intake  diet is usually what we recommend.  3. Drink adequate amounts of fluid daily. This should include water, milk and low-sugar fruit juices (example, cranberry or cran-apple). Coffee, tea, and soft drinks should not be counted as a part of this intake. Try to anticipate fluid loss which will occur with exercise or heat, and replace these fluids before they are lost.   4. Avoid caffeine-containing fluids and foods (such as coffee, tea and chocolate). Caffeine is a diuretic that causes excessive urinary loss of fluids. Caffeine also has stimulant properties that may make your symptoms worse.  5. Limit your alcohol intake to one glass of beer or wine each day. Alcohol can affect the inner ear directly, changing the volume and concentration if the inner ear fluid and increasing symptoms.  6. Avoid foods containing MSG (monosodium glutamate). This is often present in pre-packaged food products and in buffet-style food. It may increase symptoms in some patients.

## 2024-07-19 ENCOUNTER — PATIENT MESSAGE (OUTPATIENT)
Dept: OTOLARYNGOLOGY | Facility: CLINIC | Age: 34
End: 2024-07-19
Payer: COMMERCIAL

## 2024-08-05 ENCOUNTER — CLINICAL SUPPORT (OUTPATIENT)
Dept: AUDIOLOGY | Facility: CLINIC | Age: 34
End: 2024-08-05
Payer: COMMERCIAL

## 2024-08-05 DIAGNOSIS — H81.01 MENIERE DISEASE, RIGHT: Primary | ICD-10-CM

## 2024-08-05 PROCEDURE — 92584 ELECTROCOCHLEOGRAPHY: CPT | Mod: S$GLB,,, | Performed by: AUDIOLOGIST-HEARING AID FITTER

## 2024-08-05 PROCEDURE — 92540 BASIC VESTIBULAR EVALUATION: CPT | Mod: S$GLB,,, | Performed by: AUDIOLOGIST-HEARING AID FITTER

## 2024-08-05 PROCEDURE — 92537 CALORIC VSTBLR TEST W/REC: CPT | Mod: S$GLB,,, | Performed by: AUDIOLOGIST-HEARING AID FITTER

## 2024-08-06 ENCOUNTER — OFFICE VISIT (OUTPATIENT)
Dept: OTOLARYNGOLOGY | Facility: CLINIC | Age: 34
End: 2024-08-06
Payer: COMMERCIAL

## 2024-08-06 DIAGNOSIS — H81.01 MENIERE DISEASE, RIGHT: Primary | ICD-10-CM

## 2024-08-06 PROCEDURE — 99214 OFFICE O/P EST MOD 30 MIN: CPT | Mod: S$GLB,,, | Performed by: STUDENT IN AN ORGANIZED HEALTH CARE EDUCATION/TRAINING PROGRAM

## 2024-08-06 PROCEDURE — 1159F MED LIST DOCD IN RCRD: CPT | Mod: CPTII,S$GLB,, | Performed by: STUDENT IN AN ORGANIZED HEALTH CARE EDUCATION/TRAINING PROGRAM

## 2024-08-06 PROCEDURE — 99999 PR PBB SHADOW E&M-EST. PATIENT-LVL III: CPT | Mod: PBBFAC,,, | Performed by: STUDENT IN AN ORGANIZED HEALTH CARE EDUCATION/TRAINING PROGRAM

## 2024-08-06 RX ORDER — MECLIZINE HYDROCHLORIDE 25 MG/1
25 TABLET ORAL 3 TIMES DAILY PRN
Qty: 25 TABLET | Refills: 0 | Status: SHIPPED | OUTPATIENT
Start: 2024-08-06

## 2024-08-21 ENCOUNTER — LAB VISIT (OUTPATIENT)
Dept: LAB | Facility: HOSPITAL | Age: 34
End: 2024-08-21
Attending: STUDENT IN AN ORGANIZED HEALTH CARE EDUCATION/TRAINING PROGRAM
Payer: COMMERCIAL

## 2024-08-21 ENCOUNTER — OFFICE VISIT (OUTPATIENT)
Dept: ALLERGY | Facility: CLINIC | Age: 34
End: 2024-08-21
Payer: COMMERCIAL

## 2024-08-21 ENCOUNTER — PATIENT MESSAGE (OUTPATIENT)
Dept: ALLERGY | Facility: CLINIC | Age: 34
End: 2024-08-21

## 2024-08-21 VITALS
TEMPERATURE: 98 F | HEART RATE: 72 BPM | BODY MASS INDEX: 39.5 KG/M2 | WEIGHT: 252.19 LBS | DIASTOLIC BLOOD PRESSURE: 85 MMHG | OXYGEN SATURATION: 98 % | SYSTOLIC BLOOD PRESSURE: 132 MMHG

## 2024-08-21 DIAGNOSIS — J45.20 MILD INTERMITTENT ASTHMA WITHOUT COMPLICATION: ICD-10-CM

## 2024-08-21 DIAGNOSIS — B99.9 RECURRENT INFECTIONS: ICD-10-CM

## 2024-08-21 DIAGNOSIS — G90.A POTS (POSTURAL ORTHOSTATIC TACHYCARDIA SYNDROME): ICD-10-CM

## 2024-08-21 DIAGNOSIS — G90.1 DYSAUTONOMIA: ICD-10-CM

## 2024-08-21 DIAGNOSIS — J31.0 CHRONIC RHINITIS: Primary | ICD-10-CM

## 2024-08-21 DIAGNOSIS — T78.1XXA ADVERSE FOOD REACTION, INITIAL ENCOUNTER: ICD-10-CM

## 2024-08-21 DIAGNOSIS — J45.990 EXERCISE-INDUCED ASTHMA: ICD-10-CM

## 2024-08-21 LAB
BASOPHILS # BLD AUTO: 0.01 K/UL (ref 0–0.2)
BASOPHILS NFR BLD: 0.2 % (ref 0–1.9)
DIFFERENTIAL METHOD BLD: NORMAL
EOSINOPHIL # BLD AUTO: 0 K/UL (ref 0–0.5)
EOSINOPHIL NFR BLD: 0.2 % (ref 0–8)
ERYTHROCYTE [DISTWIDTH] IN BLOOD BY AUTOMATED COUNT: 13.8 % (ref 11.5–14.5)
HCT VFR BLD AUTO: 38.3 % (ref 37–48.5)
HGB BLD-MCNC: 12.8 G/DL (ref 12–16)
HIV 1+2 AB+HIV1 P24 AG SERPL QL IA: NORMAL
IGA SERPL-MCNC: 177 MG/DL (ref 40–350)
IGG SERPL-MCNC: 755 MG/DL (ref 650–1600)
IGM SERPL-MCNC: 90 MG/DL (ref 50–300)
IMM GRANULOCYTES # BLD AUTO: 0.01 K/UL (ref 0–0.04)
IMM GRANULOCYTES NFR BLD AUTO: 0.2 % (ref 0–0.5)
LYMPHOCYTES # BLD AUTO: 1.5 K/UL (ref 1–4.8)
LYMPHOCYTES NFR BLD: 30.8 % (ref 18–48)
MCH RBC QN AUTO: 30 PG (ref 27–31)
MCHC RBC AUTO-ENTMCNC: 33.4 G/DL (ref 32–36)
MCV RBC AUTO: 90 FL (ref 82–98)
MONOCYTES # BLD AUTO: 0.3 K/UL (ref 0.3–1)
MONOCYTES NFR BLD: 5.7 % (ref 4–15)
NEUTROPHILS # BLD AUTO: 3.1 K/UL (ref 1.8–7.7)
NEUTROPHILS NFR BLD: 62.9 % (ref 38–73)
NRBC BLD-RTO: 0 /100 WBC
PLATELET # BLD AUTO: 262 K/UL (ref 150–450)
PMV BLD AUTO: 10 FL (ref 9.2–12.9)
RBC # BLD AUTO: 4.26 M/UL (ref 4–5.4)
WBC # BLD AUTO: 4.87 K/UL (ref 3.9–12.7)

## 2024-08-21 PROCEDURE — 1159F MED LIST DOCD IN RCRD: CPT | Mod: CPTII,S$GLB,, | Performed by: STUDENT IN AN ORGANIZED HEALTH CARE EDUCATION/TRAINING PROGRAM

## 2024-08-21 PROCEDURE — 83520 IMMUNOASSAY QUANT NOS NONAB: CPT | Performed by: STUDENT IN AN ORGANIZED HEALTH CARE EDUCATION/TRAINING PROGRAM

## 2024-08-21 PROCEDURE — 90732 PPSV23 VACC 2 YRS+ SUBQ/IM: CPT | Mod: S$GLB,,, | Performed by: STUDENT IN AN ORGANIZED HEALTH CARE EDUCATION/TRAINING PROGRAM

## 2024-08-21 PROCEDURE — 99999 PR PBB SHADOW E&M-EST. PATIENT-LVL III: CPT | Mod: PBBFAC,,, | Performed by: STUDENT IN AN ORGANIZED HEALTH CARE EDUCATION/TRAINING PROGRAM

## 2024-08-21 PROCEDURE — 86003 ALLG SPEC IGE CRUDE XTRC EA: CPT | Performed by: STUDENT IN AN ORGANIZED HEALTH CARE EDUCATION/TRAINING PROGRAM

## 2024-08-21 PROCEDURE — 99205 OFFICE O/P NEW HI 60 MIN: CPT | Mod: 25,S$GLB,, | Performed by: STUDENT IN AN ORGANIZED HEALTH CARE EDUCATION/TRAINING PROGRAM

## 2024-08-21 PROCEDURE — 86357 NK CELLS TOTAL COUNT: CPT | Performed by: STUDENT IN AN ORGANIZED HEALTH CARE EDUCATION/TRAINING PROGRAM

## 2024-08-21 PROCEDURE — 3079F DIAST BP 80-89 MM HG: CPT | Mod: CPTII,S$GLB,, | Performed by: STUDENT IN AN ORGANIZED HEALTH CARE EDUCATION/TRAINING PROGRAM

## 2024-08-21 PROCEDURE — 86355 B CELLS TOTAL COUNT: CPT | Performed by: STUDENT IN AN ORGANIZED HEALTH CARE EDUCATION/TRAINING PROGRAM

## 2024-08-21 PROCEDURE — 86360 T CELL ABSOLUTE COUNT/RATIO: CPT | Performed by: STUDENT IN AN ORGANIZED HEALTH CARE EDUCATION/TRAINING PROGRAM

## 2024-08-21 PROCEDURE — 87389 HIV-1 AG W/HIV-1&-2 AB AG IA: CPT | Performed by: STUDENT IN AN ORGANIZED HEALTH CARE EDUCATION/TRAINING PROGRAM

## 2024-08-21 PROCEDURE — 82785 ASSAY OF IGE: CPT | Mod: 91 | Performed by: STUDENT IN AN ORGANIZED HEALTH CARE EDUCATION/TRAINING PROGRAM

## 2024-08-21 PROCEDURE — 85025 COMPLETE CBC W/AUTO DIFF WBC: CPT | Performed by: STUDENT IN AN ORGANIZED HEALTH CARE EDUCATION/TRAINING PROGRAM

## 2024-08-21 PROCEDURE — 82784 ASSAY IGA/IGD/IGG/IGM EACH: CPT | Performed by: STUDENT IN AN ORGANIZED HEALTH CARE EDUCATION/TRAINING PROGRAM

## 2024-08-21 PROCEDURE — 3008F BODY MASS INDEX DOCD: CPT | Mod: CPTII,S$GLB,, | Performed by: STUDENT IN AN ORGANIZED HEALTH CARE EDUCATION/TRAINING PROGRAM

## 2024-08-21 PROCEDURE — 86003 ALLG SPEC IGE CRUDE XTRC EA: CPT | Mod: 59 | Performed by: STUDENT IN AN ORGANIZED HEALTH CARE EDUCATION/TRAINING PROGRAM

## 2024-08-21 PROCEDURE — 86317 IMMUNOASSAY INFECTIOUS AGENT: CPT | Mod: 59 | Performed by: STUDENT IN AN ORGANIZED HEALTH CARE EDUCATION/TRAINING PROGRAM

## 2024-08-21 PROCEDURE — 36415 COLL VENOUS BLD VENIPUNCTURE: CPT | Performed by: STUDENT IN AN ORGANIZED HEALTH CARE EDUCATION/TRAINING PROGRAM

## 2024-08-21 PROCEDURE — 3075F SYST BP GE 130 - 139MM HG: CPT | Mod: CPTII,S$GLB,, | Performed by: STUDENT IN AN ORGANIZED HEALTH CARE EDUCATION/TRAINING PROGRAM

## 2024-08-21 PROCEDURE — 90471 IMMUNIZATION ADMIN: CPT | Mod: S$GLB,,, | Performed by: STUDENT IN AN ORGANIZED HEALTH CARE EDUCATION/TRAINING PROGRAM

## 2024-08-21 PROCEDURE — 82784 ASSAY IGA/IGD/IGG/IGM EACH: CPT | Mod: 59 | Performed by: STUDENT IN AN ORGANIZED HEALTH CARE EDUCATION/TRAINING PROGRAM

## 2024-08-21 PROCEDURE — 82785 ASSAY OF IGE: CPT | Performed by: STUDENT IN AN ORGANIZED HEALTH CARE EDUCATION/TRAINING PROGRAM

## 2024-08-21 RX ORDER — AZELASTINE 1 MG/ML
1 SPRAY, METERED NASAL 2 TIMES DAILY
Qty: 30 ML | Refills: 11 | Status: SHIPPED | OUTPATIENT
Start: 2024-08-21 | End: 2025-08-21

## 2024-08-21 NOTE — PROGRESS NOTES
Allergy and Immunology  New Patient Clinic Note    Date: 8/21/2024  Chief Complaint   Patient presents with    Immunotherapy     Referred by: Self, Aaareferral  No address on file    History  Dhara Amezcua is a 33 y.o. female being seen as a New Patient today.    Chronic Rhinitis   - Onset: Childhood with persistence into adulthood   - Symptoms: Congestion, rhinorrhea, some sneezing, PND (major complaint), throat clearing   - Suspected triggers include: Environmental and post-infectious  - Pattern: Perennial with Seasonal Exacerbations  - Medications: Antihistamines in the past     Chronic or Inducible Urticaria  - No hx of chronic urticaria     Mild Intermittent Asthma  Exercise Induced Asthma   - Onset: Childhood   - Symptoms: Sob, chest tightness wheezing   - Medications: Albuterol PRN in the past   - PO Steroids: No   - Hospitalization/Intubation: No   - Suspected triggers include: Exercise  - Smoking: No  - ASA/NSAID use: No  - Hx of CRSwNP: No  - Control/Albuterol Use: Infrequent use  - Nocturnal symptoms: No    CRSwNP  - No hx of CRSwNP    Eczema   - No hx of eczema     Eosinophilic Esophagitis  - No hx of eosinophilic esophagitis     Food Allergy  - Concern for [place] and Perch allergy   - Swelling and hives in childhood   - Patient tolerates shellfish     Drug Allergy  - See allergy tab for positives  - To be addressed more at next appointment     Recurrent Infections  - Prior hx of sinus sx and FESS   - Multiple sinus infections per year   - Started more in adulthood - especially in college     Venom Allergy  - No hx of venom allergy     Dysautonomia/POTS  - Concern for EDS in childhood but hypermobility alone  - Issues with POTS  - No tryptase on file - mast cell to rule out    Allergies, PMH, PSH, Social, and Family History were reviewed.    Review of patient's allergies indicates:   Allergen Reactions    Codeine Diarrhea, Nausea And Vomiting, Other (See Comments), Palpitations, Shortness Of Breath  and Tinitus    [place] oil Anaphylaxis    Flexeril [cyclobenzaprine] Other (See Comments)    Hydrocodone-acetaminoph-supp11 Diarrhea      Past Medical History:   Diagnosis Date    Abnormality of right breast on screening mammogram 02/09/2024    Anxiety state     dx x 10 years    Aseptic necrosis of head and neck of femur     Avascular/Aseptic Necrosis of Femoral Head-right foot; developed after bunion surgery    Calculus of kidney     Chronic sinusitis     Disorder of gallbladder     Disorder of sulfur-bearing amino acid metabolism     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; no h/o DVTs/PEs; strong family h/o DVTs; w/u resulted in MTFHR and prothrombin gene mutation    Family history of breast cancer 02/14/2024    Gout     Hearing loss     Hypothyroidism     Hypothyroidism-2014    Myopia     Myopia of both eyes    Osteoarthrosis, unspecified whether generalized or localized     Other chronic nonalcoholic liver disease     :Fatty liver disease, nonalcoholic-2014 - seen on u/s; [facility] in [place] reports that there was hepatocellular disease; hepatitis w/u is wnl    Polycystic ovaries     Primary hypercoagulable state     Prothrombin Gene Mutation-2017; no h/o DVTs/PEs; strong family h/o DVTs; w/u resulted in MTFHR and prothrombin gene mutation    Rheumatoid arthritis     Rheumatoid arthritis flare    Sicca syndrome     Sjogren's syndrome-2017     Past Surgical History:   Procedure Laterality Date    BUNIONECTOMY      ELBOW SURGERY      KNEE SURGERY      SINUS SURGERY      WISDOM TOOTH EXTRACTION Bilateral      Social History     Social History Narrative    ** Merged History Encounter **          S/he reports that she has never smoked. She has never used smokeless tobacco. No history on file for alcohol use and drug use.    Current Outpatient Medications on File Prior to Visit   Medication Sig Dispense Refill    ALPRAZolam (XANAX) 0.5 MG tablet Take 0.5 mg by mouth daily as needed for Anxiety.       azaTHIOprine (IMURAN) 50 mg Tab Take 25 mg by mouth 2 (two) times a day.      busPIRone (BUSPAR) 15 MG tablet Take 15 mg by mouth 2 (two) times daily.      hyoscyamine sulfate (HYOSYNE ORAL) Take by mouth.      meclizine (ANTIVERT) 25 mg tablet Take 1 tablet (25 mg total) by mouth 3 (three) times daily as needed. 25 tablet 0    vitamin D (VITAMIN D3) 1000 units Tab Take 1,000 Units by mouth once daily.       No current facility-administered medications on file prior to visit.     Physical Examination  Vitals:    08/21/24 1448   BP: 132/85   Pulse: 72   Temp: 97.9 °F (36.6 °C)     GENERAL:  female in no apparent distress and well developed and well nourished  HEAD:  Normocephalic, without obvious abnormality, atraumatic  EYES: sclera anicteric, conjunctiva normochromic  EARS: normal TM's and external ear canals both ears  NOSE: without erythema or discharge, clear discharge, turbinates normal    OROPHARYNX: moist mucous membranes without erythema, exudates or petechiae, clear post-nasal drainage present  LYMPH NODES: normal, supple, no lymphadenopathy  LUNGS: clear to auscultation, no wheezes, rales or rhonchi, symmetric air entry.  HEART: normal rate, regular rhythm, normal S1, S2, no murmurs, rubs, clicks or gallops.  ABDOMEN: soft, nontender, nondistended, no masses or organomegaly.  MUSCULOSKELETAL: no gross joint deformity or swelling.  NEURO: alert, oriented, normal speech, no focal findings or movement disorder noted.  SKIN: normal coloration and turgor, no rashes, no suspicious skin lesions noted.     Assessment/Plan:   Problem List Items Addressed This Visit          Neuro    Dysautonomia    Current Assessment & Plan     - Rule out mast cell disease at this time             ENT    Chronic rhinitis - Primary    Current Assessment & Plan     - Not controlled at this time   - Ordered Flonase 1 SEN BID   - Ordered Astelin 1 SEN BID   - Educated on proper use of intranasal sprays  - Ordered Serum IgE to Zone 6  Aeroallergens    - Will continue to monitor and reassess            Relevant Medications    azelastine (ASTELIN) 137 mcg (0.1 %) nasal spray       Pulmonary    Mild intermittent asthma without complication    Current Assessment & Plan     - No major complaints at this time   - Ordered Airsupra PRN   - Educated on proper use of inhalers including an in-person demonstration of proper technique  - Expressed understanding of demonstrated technique  - ED precautions discussed at length   - Will continue to monitor and reassess         Relevant Medications    albuterol-budesonide (AIRSUPRA) 90-80 mcg/actuation    Exercise-induced asthma    Relevant Medications    albuterol-budesonide (AIRSUPRA) 90-80 mcg/actuation       Cardiac/Vascular    POTS (postural orthostatic tachycardia syndrome)       ID    Recurrent infections    Overview     - 08/21/2024: Administered PPSV23     - Prior hx of sinus sx and FESS   - Multiple sinus infections per year   - Started more in adulthood - especially in college            Current Assessment & Plan     - Humoral evaluation at this time          Relevant Orders    Allergen Profile, Zone 6    HIV 1/2 Ag/Ab (4th Gen) (Completed)    CBC Auto Differential (Completed)    Streptococcus Pneumoniae IgG Antibody (23 Serotypes), MAID    IgE    IgG (Completed)    IgM (Completed)    IgA (Completed)    Tryptase    Lynphocyte Subset Panel 7 - Congenital Immunodeficiencies       Other    Adverse food reaction    Current Assessment & Plan     - Lab testing at this time   - Educated on avoidance at this time  - ED precautions discussed at length   - Will continue to monitor and reassess          Relevant Orders    Allergen Tuna IgE    Allergen [place] IgE    Allergen Codfish IgE    Allergen-Catfish     Follow up:  Follow up in about 6 weeks (around 10/2/2024).    Greater than 60 minutes spent on the complex healthcare of this patient including but not limited to outside record review.     Ezequiel Welch  MD [facility]  Allergy and Immunology

## 2024-08-22 PROBLEM — B99.9 RECURRENT INFECTIONS: Status: ACTIVE | Noted: 2024-08-22

## 2024-08-22 PROBLEM — G90.A POTS (POSTURAL ORTHOSTATIC TACHYCARDIA SYNDROME): Status: ACTIVE | Noted: 2024-08-22

## 2024-08-22 PROBLEM — T78.1XXA ADVERSE FOOD REACTION: Status: ACTIVE | Noted: 2024-08-22

## 2024-08-22 PROBLEM — G90.1 DYSAUTONOMIA: Status: ACTIVE | Noted: 2024-08-22

## 2024-08-22 PROBLEM — J31.0 CHRONIC RHINITIS: Status: ACTIVE | Noted: 2024-08-22

## 2024-08-22 LAB — IGE SERPL-ACNC: <35 IU/ML (ref 0–100)

## 2024-08-22 NOTE — ASSESSMENT & PLAN NOTE
- Lab testing at this time   - Educated on avoidance at this time  - ED precautions discussed at length   - Will continue to monitor and reassess

## 2024-08-22 NOTE — ASSESSMENT & PLAN NOTE
- Not controlled at this time   - Ordered Flonase 1 SEN BID   - Ordered Astelin 1 SEN BID   - Educated on proper use of intranasal sprays  - Ordered Serum IgE to Zone 6 Aeroallergens    - Will continue to monitor and reassess

## 2024-08-22 NOTE — ASSESSMENT & PLAN NOTE
- No major complaints at this time   - Ordered Airsupra PRN   - Educated on proper use of inhalers including an in-person demonstration of proper technique  - Expressed understanding of demonstrated technique  - ED precautions discussed at length   - Will continue to monitor and reassess

## 2024-08-23 LAB
ANNOTATION COMMENT IMP: ABNORMAL
CD19 CELLS NFR SPEC: 6 % (ref 6–23)
CD2 CELLS # BLD: 1492 CELLS/UL (ref 700–2600)
CD2 CELLS NFR BLD: 93 % (ref 73–91)
CD3 CELLS # BLD: 1575 CELLS/UL (ref 570–2400)
CD3 CELLS NFR SPEC: 93 % (ref 62–87)
CD3+CD4+ CELLS # BLD: 1012 CELLS/UL (ref 430–1800)
CD3+CD4+ CELLS NFR BLD: 60 % (ref 32–64)
CD3+CD4+ CELLS/CD3+CD8+ CLL BLD: 1.94 RATIO (ref 0.8–3.9)
CD3+CD8+ CELLS # BLD: 528 CELLS/UL (ref 210–1200)
CD3+CD8+ CELLS NFR SPEC: 31 % (ref 15–46)
CD3-CD16+CD56+ CELLS # SPEC: 16 CELLS/UL (ref 78–470)
CD3-CD16+CD56+ CELLS NFR SPEC: 1 % (ref 4–26)
CD4+CD45RA+ CELLS # BLD: 423 CELLS/UL (ref 150–870)
CD4+CD45RO+ CELLS # BLD: 529 CELLS/UL (ref 190–1050)
CD4+CD45RO+ CELLS NFR BLD: 56 % (ref 28–72)
CD45RA CELLS NFR BLD: 44 % (ref 28–71)
CELLS.CD3-CD19+ [#/VOLUME] IN BLOOD: 98 CELLS/UL (ref 91–610)
HLA-DR+ CELLS # BLD: 104 CELLS/UL (ref 100–640)
HLA-DR+ CELLS NFR BLD: 6 % (ref 8–24)

## 2024-08-26 ENCOUNTER — PATIENT MESSAGE (OUTPATIENT)
Dept: ALLERGY | Facility: CLINIC | Age: 34
End: 2024-08-26
Payer: COMMERCIAL

## 2024-08-26 LAB
A ALTERNATA IGE QN: <0.1 KU/L
A FUMIGATUS IGE QN: <0.1 KU/L
ALLERGEN BOXELDER MAPLE TREE IGE: <0.1 KU/L
ALLERGEN MULBERRY TREE IGE: <0.1 KU/L
ALLERGEN PIGWEED IGE: <0.1 KU/L
ALLERGEN WALNUT TREE IGE: <0.1 KU/L
BERMUDA GRASS IGE QN: <0.1 KU/L
C HERBARUM IGE QN: <0.1 KU/L
CAT DANDER IGE QN: <0.1 KU/L
CATFISH IGE QN: <0.1 KU/L
CODFISH IGE QN: <0.1 KU/L
COMMON RAGWEED IGE QN: <0.1 KU/L
D FARINAE IGE QN: <0.1 KU/L
D PTERONYSS IGE QN: <0.1 KU/L
DEPRECATED TIMOTHY IGE RAST QL: NORMAL
DOG DANDER IGE QN: <0.1 KU/L
ELDER IGE QN: <0.1 KU/L
IGE: <2 IU/ML
MOUSE URINE PROT IGE QN: <0.1 KU/L
MT JUNIPER IGE QN: <0.1 KU/L
P NOTATUM IGE QN: <0.1 KU/L
PECAN/HICK TREE IGE QN: <0.1 KU/L
RAST ALLERGEN INTERPRETATION: NORMAL
RAST CLASS: NORMAL
ROACH IGE QN: <0.1 KU/L
SALMON IGE QN: <0.1 KU/L
SILVER BIRCH IGE QN: <0.1 KU/L
TIMOTHY IGE QN: <0.1 KU/L
TRYPTASE LEVEL: 3.8 NG/ML
TUNA IGE QN: <0.1 KU/L
WHITE ELM IGE QN: <0.1 KU/L
WHITE OAK IGE QN: <0.1 KU/L

## 2024-08-28 LAB
IMMUNOLOGIST REVIEW: NORMAL
S PN DA SERO 19F IGG SER-MCNC: 0.3 MCG/ML
S PNEUM DA 1 IGG SER-MCNC: 0.2 MCG/ML
S PNEUM DA 10A IGG SER-MCNC: 0.2 MCG/ML
S PNEUM DA 11A IGG SER-MCNC: 0.6 MCG/ML
S PNEUM DA 12F IGG SER-MCNC: 0.1 MCG/ML
S PNEUM DA 14 IGG SER-MCNC: 0.1 MCG/ML
S PNEUM DA 15B IGG SER-MCNC: 0.4 MCG/ML
S PNEUM DA 17F IGG SER-MCNC: 0.2 MCG/ML
S PNEUM DA 18C IGG SER-MCNC: <0.1 MCG/ML
S PNEUM DA 19A IGG SER-MCNC: 0.3 MCG/ML
S PNEUM DA 2 IGG SER-MCNC: 0.3 MCG/ML
S PNEUM DA 20A IGG SER-MCNC: 0.8 MCG/ML
S PNEUM DA 22F IGG SER-MCNC: 0.2 MCG/ML
S PNEUM DA 23F IGG SER-MCNC: 0.1 MCG/ML
S PNEUM DA 3 IGG SER-MCNC: <0.1 MCG/ML
S PNEUM DA 33F IGG SER-MCNC: 0.5 MCG/ML
S PNEUM DA 4 IGG SER-MCNC: 0.3 MCG/ML
S PNEUM DA 5 IGG SER-MCNC: <0.1 MCG/ML
S PNEUM DA 6B IGG SER-MCNC: 0.1 MCG/ML
S PNEUM DA 7F IGG SER-MCNC: 0.1 MCG/ML
S PNEUM DA 8 IGG SER-MCNC: 0.2 MCG/ML
S PNEUM DA 9N IGG SER-MCNC: 0.1 MCG/ML
S PNEUM DA 9V IGG SER-MCNC: 0.1 MCG/ML

## 2024-09-23 ENCOUNTER — PATIENT MESSAGE (OUTPATIENT)
Dept: OTOLARYNGOLOGY | Facility: CLINIC | Age: 34
End: 2024-09-23
Payer: COMMERCIAL

## 2024-09-24 NOTE — TELEPHONE ENCOUNTER
We haven't gone too deep into her throat issues. She needs exam and likely scope first to figure out what's going on and if a scan is needed.

## 2024-10-01 ENCOUNTER — OFFICE VISIT (OUTPATIENT)
Dept: OTOLARYNGOLOGY | Facility: CLINIC | Age: 34
End: 2024-10-01
Payer: COMMERCIAL

## 2024-10-01 VITALS — HEIGHT: 68 IN | WEIGHT: 257.06 LBS | BODY MASS INDEX: 38.96 KG/M2

## 2024-10-01 DIAGNOSIS — K21.9 LARYNGOPHARYNGEAL REFLUX (LPR): ICD-10-CM

## 2024-10-01 DIAGNOSIS — M54.2 NECK PAIN: Primary | ICD-10-CM

## 2024-10-01 PROCEDURE — 1159F MED LIST DOCD IN RCRD: CPT | Mod: CPTII,S$GLB,, | Performed by: STUDENT IN AN ORGANIZED HEALTH CARE EDUCATION/TRAINING PROGRAM

## 2024-10-01 PROCEDURE — 99213 OFFICE O/P EST LOW 20 MIN: CPT | Mod: 25,S$GLB,, | Performed by: STUDENT IN AN ORGANIZED HEALTH CARE EDUCATION/TRAINING PROGRAM

## 2024-10-01 PROCEDURE — 31575 DIAGNOSTIC LARYNGOSCOPY: CPT | Mod: S$GLB,,, | Performed by: STUDENT IN AN ORGANIZED HEALTH CARE EDUCATION/TRAINING PROGRAM

## 2024-10-01 PROCEDURE — 99999 PR PBB SHADOW E&M-EST. PATIENT-LVL III: CPT | Mod: PBBFAC,,, | Performed by: STUDENT IN AN ORGANIZED HEALTH CARE EDUCATION/TRAINING PROGRAM

## 2024-10-01 PROCEDURE — 3008F BODY MASS INDEX DOCD: CPT | Mod: CPTII,S$GLB,, | Performed by: STUDENT IN AN ORGANIZED HEALTH CARE EDUCATION/TRAINING PROGRAM

## 2024-10-01 RX ORDER — PANTOPRAZOLE SODIUM 40 MG/1
40 TABLET, DELAYED RELEASE ORAL DAILY
Qty: 60 TABLET | Refills: 3 | Status: SHIPPED | OUTPATIENT
Start: 2024-10-01 | End: 2025-10-01

## 2024-10-01 NOTE — PROGRESS NOTES
Chief complaint:    Chief Complaint   Patient presents with    Throat     Pt is coming in today for throat pain that started 6 weeks ago, pt c/o of burning sensation, throat pressure while swallowing           Referring Provider:  No referring provider defined for this encounter.      History of present illness:     Ms. Amezcua is a 34 y.o. presenting for evaluation of throat pain.     Onset: 6 weeks ago    []  Solid dysphagia   []  Liquid dysphagia  []  Coughing/choking with swallow  []  Delayed regurgitation or vomiting  []  Tobacco exposure  []  Unintentional weight loss  []  Recurrent pneumonia  [x]  Globus sensation   []  Sensation of pills getting stuck  [x]  Heartburn or chest tightness - rare  []  Voice change  []  Odynophagia  [x]  Otalgia  []  Neck mass  []  History of head & neck radiation  []  Neurologic disorder/symptoms    Associated burning sensation, throat pressure when swallowing, ear pain; feels symptoms more on right side  Present all the time, but sometimes worse with eating, worse with spicy food         History      Past Medical History:   Past Medical History:   Diagnosis Date    Abnormality of right breast on screening mammogram 02/09/2024    Anxiety state     dx x 10 years    Aseptic necrosis of head and neck of femur     Avascular/Aseptic Necrosis of Femoral Head-right foot; developed after bunion surgery    Calculus of kidney     Chronic sinusitis     Disorder of gallbladder     Disorder of sulfur-bearing amino acid metabolism     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; no h/o DVTs/PEs; strong family h/o DVTs; w/u resulted in MTFHR and prothrombin gene mutation    Family history of breast cancer 02/14/2024    Gout     Hearing loss     Hypothyroidism     Hypothyroidism-2014    Myopia     Myopia of both eyes    Osteoarthrosis, unspecified whether generalized or localized     Other chronic nonalcoholic liver disease     :Fatty liver disease, nonalcoholic-2014 - seen on u/s;  "[facility] in [place] reports that there was hepatocellular disease; hepatitis w/u is wnl    Polycystic ovaries     Primary hypercoagulable state     Prothrombin Gene Mutation-2017; no h/o DVTs/PEs; strong family h/o DVTs; w/u resulted in MTFHR and prothrombin gene mutation    Rheumatoid arthritis     Rheumatoid arthritis flare    Sicca syndrome     Sjogren's syndrome-2017         Past Surgical History:  Past Surgical History:   Procedure Laterality Date    BUNIONECTOMY      ELBOW SURGERY      KNEE SURGERY      SINUS SURGERY      WISDOM TOOTH EXTRACTION Bilateral          Medications: Medication list reviewed. She  has a current medication list which includes the following prescription(s): albuterol-budesonide, alprazolam, azathioprine, azelastine, buspirone, hyoscyamine sulfate, meclizine, vitamin d, and pantoprazole.     Allergies:   Review of patient's allergies indicates:   Allergen Reactions    Codeine Diarrhea, Nausea And Vomiting, Other (See Comments), Palpitations, Shortness Of Breath and Tinitus    [place] oil Anaphylaxis    Flexeril [cyclobenzaprine] Other (See Comments)    Hydrocodone-acetaminoph-supp11 Diarrhea         Family history: family history includes Breast cancer in her mother.         Social History          Alcohol use:  has no history on file for alcohol use.            Tobacco:  reports that she has never smoked. She has never used smokeless tobacco.         Physical Examination      Vitals: Height 5' 8.4" (1.737 m), weight 116.6 kg (257 lb 0.9 oz).      General: Well developed, well nourished, well hydrated.   Voice: no hoarseness, no dysarthria      Head/Face: Normocephalic, atraumatic. No scars or lesions. Facial musculature equal.     Eyes: No scleral icterus or conjunctival hemorrhage. EOMI. PERRLA.     Ears:     Right ear: No gross deformity. EAC is clear of debris and erythema. TM are intact with a pneumatized middle ear. No signs of retraction, fluid or infection.      Left ear: No gross " deformity. EAC is clear of debris and erythema. TM are intact with a pneumatized middle ear. No signs of retraction, fluid or infection.      Nose: No gross deformity or lesions. No purulent discharge. No significant NSD.      Mouth/Oropharynx: Lips without any lesions. No mucosal lesions within the oropharynx. No tonsillar exudate or lesions. Pharyngeal walls symmetrical. Uvula midline. Tongue midline without lesions.     Neck: Trachea midline. No masses. No thyromegaly or nodules palpated.     Lymphatic: No lymphadenopathy in the neck.     Extremities: No cyanosis. Warm and well-perfused.     Skin: No scars or lesions on face or neck.      Neurologic: Moving all extremities without gross abnormality.CN II-XII grossly intact. House-Brackmann 1/6. No signs of nystagmus.          Data reviewed      Review of records:      I reviewed records from the referring provider's office visits.  These describe the history, workup, and/or treatment of this problem thus far      Procedures:    Procedure -Transnasal fiberoptic laryngoscopy     Surgeon: Yinka Green M.D. .      Anesthesia: topical 0.05% oxymetazoline with 4% lidocaine      Complications: None.     Description of Procedure: With the patient in the sitting position, topical lidocaine and oxymetazoline was applied to the nose. The scope was passed through the nose. Examination was carried out of the nose, nasopharynx, oropharynx, hypopharynx, and larynx with findings as noted above. Scope was removed. The patient tolerated the procedure well.      Findings: Normal vocal fold motion. No masses or lesions of the nose, nasopharynx, oropharynx, hypopharynx, or larynx. Erythema and post cricoid redundancy consistent with laryngopharyngeal reflux         Assessment/Plan:      1. Neck pain    2. Laryngopharyngeal reflux (LPR)         The patient has evidence of laryngopharyngeal reflux (LPR).  We discussed behavioral modifications such as raising the head of the bed,  avoiding tight clothing or belts, avoiding eating within 2 hours of laying down, and weight loss. We also discussed dietary changes that can improve acid reflux symptoms, including, but not limited to avoidance of caffeine, peppermints, alcohol, greasy and fried foods, tomato-based sauces, and tobacco. H2 blockers (ie. Pepcid) or antacids (ie. Tums) can help as well. However, for persisting chronic or daily symptoms, a trial of proton pump inhibitors (ie. Omeprazole) is recommended. This is to be taken 30 minutes prior to breakfast and consistently for at least 3-4 weeks before you may notice improvement in symptoms. The addition of alginate therapy (Reflux Raft or Reflux Gourmet) can be an excellent adjuvant natural therapy as well. We discussed that if symptoms do not improve in 2-3 months, or other concerning symptoms develop (odynophagia, dysphagia, weight loss or GI bleeding) they should alert our office or return for a repeat exam.     US neck to rule out structural pathology due to localizing neck pain/fullness        Yinka Green MD  [facility]   [facility] [address]  [place] [address]  P: [phone]  F: [phone]

## 2024-10-03 ENCOUNTER — HOSPITAL ENCOUNTER (OUTPATIENT)
Dept: RADIOLOGY | Facility: HOSPITAL | Age: 34
Discharge: HOME | End: 2024-10-03
Attending: STUDENT IN AN ORGANIZED HEALTH CARE EDUCATION/TRAINING PROGRAM
Payer: COMMERCIAL

## 2024-10-03 ENCOUNTER — PATIENT MESSAGE (OUTPATIENT)
Dept: ALLERGY | Facility: CLINIC | Age: 34
End: 2024-10-03

## 2024-10-03 ENCOUNTER — OFFICE VISIT (OUTPATIENT)
Dept: ALLERGY | Facility: CLINIC | Age: 34
End: 2024-10-03
Payer: COMMERCIAL

## 2024-10-03 VITALS
DIASTOLIC BLOOD PRESSURE: 87 MMHG | TEMPERATURE: 97 F | HEART RATE: 83 BPM | SYSTOLIC BLOOD PRESSURE: 125 MMHG | WEIGHT: 252.19 LBS | BODY MASS INDEX: 38.22 KG/M2 | HEIGHT: 68 IN

## 2024-10-03 DIAGNOSIS — J45.40 MODERATE PERSISTENT ASTHMA WITHOUT COMPLICATION: Primary | ICD-10-CM

## 2024-10-03 DIAGNOSIS — D84.89 NATURAL KILLER (NK) CELL DEFICIENCY: ICD-10-CM

## 2024-10-03 DIAGNOSIS — T78.1XXD ADVERSE FOOD REACTION, SUBSEQUENT ENCOUNTER: ICD-10-CM

## 2024-10-03 DIAGNOSIS — M54.2 NECK PAIN: ICD-10-CM

## 2024-10-03 DIAGNOSIS — B99.9 RECURRENT INFECTIONS: ICD-10-CM

## 2024-10-03 DIAGNOSIS — J45.990 EXERCISE-INDUCED ASTHMA: ICD-10-CM

## 2024-10-03 DIAGNOSIS — J31.0 CHRONIC NONALLERGIC RHINITIS: ICD-10-CM

## 2024-10-03 PROBLEM — J45.20 MILD INTERMITTENT ASTHMA WITHOUT COMPLICATION: Status: RESOLVED | Noted: 2024-08-21 | Resolved: 2024-10-03

## 2024-10-03 PROCEDURE — 76536 US EXAM OF HEAD AND NECK: CPT | Mod: TC

## 2024-10-03 PROCEDURE — 99999 PR PBB SHADOW E&M-EST. PATIENT-LVL III: CPT | Mod: PBBFAC,,, | Performed by: STUDENT IN AN ORGANIZED HEALTH CARE EDUCATION/TRAINING PROGRAM

## 2024-10-03 PROCEDURE — 76536 US EXAM OF HEAD AND NECK: CPT | Mod: 26,,, | Performed by: RADIOLOGY

## 2024-10-03 RX ORDER — FLUTICASONE FUROATE, UMECLIDINIUM BROMIDE AND VILANTEROL TRIFENATATE 200; 62.5; 25 UG/1; UG/1; UG/1
1 POWDER RESPIRATORY (INHALATION) DAILY
Qty: 60 EACH | Refills: 11 | Status: SHIPPED | OUTPATIENT
Start: 2024-10-03 | End: 2025-10-03

## 2024-10-03 NOTE — ASSESSMENT & PLAN NOTE
- Trial of Trelegy at this time   - Educated on proper use of inhalers including an in-person demonstration of proper technique  - Expressed understanding of demonstrated technique  - ED precautions discussed at length   - Will continue to monitor and reassess

## 2024-10-03 NOTE — PROGRESS NOTES
Allergy and Immunology  Established Patient Clinic Note    Date: 10/3/2024  Chief Complaint   Patient presents with    Follow-up     History  Dhara Amezcua is a 34 y.o. female being seen for follow-up today.    Recurrent Infections   NK Cell Deficiency  - EBV - concern for chronic mono  - Repeating flow cytometry   - No infections reported since prior appointment   Initial HPI:   - Prior hx of sinus sx and FESS   - Multiple sinus infections per year   - Started more in adulthood - especially in college    Chronic Nonallergic Rhinitis   - No acute complaints     Moderate Persistent Asthma?  Shortness of Breath   Exercise Induced Asthma   - Trial of Trelegy for SOB      Food Allergy  - 08/21/2024: Serum IgE negative to catfish, codfish, salmon, and tuna  - Patient does not want an oral challenge at this time   - Concern for [place] and [place] allergy   - Swelling and hives in childhood   - Patient tolerates shellfish      Drug Allergy  - See allergy tab for positives  - To be addressed more at next appointment      Dysautonomia/POTS  - Concern for EDS in childhood but hypermobility alone  - Issues with POTS  - No tryptase on file - mast cell to rule out    Allergies, PMH, PSH, Social, and Family History were reviewed.    Current Outpatient Medications on File Prior to Visit   Medication Sig Dispense Refill    albuterol-budesonide (AIRSUPRA) 90-80 mcg/actuation Inhale 2 puffs into the lungs every 4 (four) hours. 10.7 g 11    ALPRAZolam (XANAX) 0.5 MG tablet Take 0.5 mg by mouth daily as needed for Anxiety.      azaTHIOprine (IMURAN) 50 mg Tab Take 25 mg by mouth 2 (two) times a day.      azelastine (ASTELIN) 137 mcg (0.1 %) nasal spray 1 spray (137 mcg total) by Nasal route 2 (two) times daily. 30 mL 11    busPIRone (BUSPAR) 15 MG tablet Take 15 mg by mouth 2 (two) times daily.      hyoscyamine sulfate (HYOSYNE ORAL) Take by mouth.      meclizine (ANTIVERT) 25 mg tablet Take 1 tablet (25 mg total) by mouth 3 (three)  times daily as needed. 25 tablet 0    pantoprazole (PROTONIX) 40 MG tablet Take 1 tablet (40 mg total) by mouth once daily. 60 tablet 3    vitamin D (VITAMIN D3) 1000 units Tab Take 1,000 Units by mouth once daily.       No current facility-administered medications on file prior to visit.     Physical Examination  Vitals:    10/03/24 0905   BP: 125/87   Pulse: 83   Temp: 97.3 °F (36.3 °C)     GENERAL:  female in no apparent distress and well developed and well nourished  HEAD:  Normocephalic, without obvious abnormality, atraumatic  EYES: sclera anicteric, conjunctiva normochromic  EARS: normal TM's and external ear canals both ears  NOSE: without erythema or discharge, clear discharge, turbinates normal    OROPHARYNX: moist mucous membranes without erythema, exudates or petechiae  LYMPH NODES: normal, supple, no lymphadenopathy  LUNGS: clear to auscultation, no wheezes, rales or rhonchi, symmetric air entry.  HEART: normal rate, regular rhythm, normal S1, S2, no murmurs, rubs, clicks or gallops.  ABDOMEN: soft, nontender, nondistended, no masses or organomegaly.  MUSCULOSKELETAL: no gross joint deformity or swelling.  NEURO: alert, oriented, normal speech, no focal findings or movement disorder noted.  SKIN: normal coloration and turgor, no rashes, no suspicious skin lesions noted.     Assessment/Plan:   Problem List Items Addressed This Visit          ENT    Chronic nonallergic rhinitis    Overview     - 08/21/2024: Serum IgE to Zone 6 Aeroallergens negative          Current Assessment & Plan     - Improved since prior appointment   - Continue current medications   - Will continue to monitor and reassess             Pulmonary    Exercise-induced asthma    Moderate persistent asthma without complication - Primary    Current Assessment & Plan     - Trial of Trelegy at this time   - Educated on proper use of inhalers including an in-person demonstration of proper technique  - Expressed understanding of demonstrated  technique  - ED precautions discussed at length   - Will continue to monitor and reassess            ID    Recurrent infections    Overview     - 08/21/2024: Administered PPSV23     - Prior hx of sinus sx and FESS   - Multiple sinus infections per year   - Started more in adulthood - especially in college            Current Assessment & Plan     - Repeat flow cytometry          Relevant Orders    VARICELLA ZOSTER ANTIBODY, IGG    PARVOVIRUS B19 ANTIBODY, IGG AND IGM    HERPES SIMPLEX 1&2 IGG    JT-BARR VIRUS ANTIBODY PANEL    Cytomegalovirus Antibody, IgG    Lynphocyte Subset Panel 7 - Congenital Immunodeficiencies    Streptococcus pneumoniae IgG Antibody (23 Serotypes), MAID       Immunology/Multi System    Natural killer (NK) cell deficiency    Current Assessment & Plan     - EBV - concern for chronic mono  - Repeating flow cytometry   - No infections reported since prior appointment             Other    Adverse food reaction    Overview     - 08/21/2024: Serum IgE negative to catfish, codfish, salmon, and tuna         Current Assessment & Plan     - Educated on lab results   - Not wanting an oral challenge at this time           Follow up:  Follow up in about 6 weeks (around 11/14/2024).    Greater than 40 minutes spent on the complex healthcare of this patient.   Additional time spent on concern for immunodeficiency.     Ezequiel Welch MD   [facility]  Allergy and Immunology

## 2024-10-03 NOTE — ASSESSMENT & PLAN NOTE
- EBV - concern for chronic mono  - Repeating flow cytometry   - No infections reported since prior appointment

## 2024-10-04 ENCOUNTER — PATIENT MESSAGE (OUTPATIENT)
Dept: ALLERGY | Facility: CLINIC | Age: 34
End: 2024-10-04
Payer: COMMERCIAL

## 2024-10-07 RX ORDER — FLUTICASONE FUROATE, UMECLIDINIUM BROMIDE AND VILANTEROL TRIFENATATE 200; 62.5; 25 UG/1; UG/1; UG/1
1 POWDER RESPIRATORY (INHALATION) DAILY
Qty: 60 EACH | Refills: 11 | Status: SHIPPED | OUTPATIENT
Start: 2024-10-07 | End: 2025-10-07

## 2024-11-13 ENCOUNTER — PATIENT MESSAGE (OUTPATIENT)
Dept: ALLERGY | Facility: CLINIC | Age: 34
End: 2024-11-13
Payer: COMMERCIAL

## 2024-11-14 ENCOUNTER — OFFICE VISIT (OUTPATIENT)
Dept: ALLERGY | Facility: CLINIC | Age: 34
End: 2024-11-14
Payer: COMMERCIAL

## 2024-11-14 VITALS
OXYGEN SATURATION: 98 % | DIASTOLIC BLOOD PRESSURE: 85 MMHG | BODY MASS INDEX: 39.03 KG/M2 | SYSTOLIC BLOOD PRESSURE: 125 MMHG | HEIGHT: 68 IN | WEIGHT: 257.5 LBS | HEART RATE: 88 BPM | TEMPERATURE: 98 F

## 2024-11-14 DIAGNOSIS — J45.990 EXERCISE-INDUCED ASTHMA: ICD-10-CM

## 2024-11-14 DIAGNOSIS — G90.A POTS (POSTURAL ORTHOSTATIC TACHYCARDIA SYNDROME): ICD-10-CM

## 2024-11-14 DIAGNOSIS — J45.40 MODERATE PERSISTENT ASTHMA WITHOUT COMPLICATION: Primary | ICD-10-CM

## 2024-11-14 DIAGNOSIS — J31.0 CHRONIC NONALLERGIC RHINITIS: ICD-10-CM

## 2024-11-14 DIAGNOSIS — G90.1 DYSAUTONOMIA: ICD-10-CM

## 2024-11-14 DIAGNOSIS — D84.89 NATURAL KILLER (NK) CELL DEFICIENCY: ICD-10-CM

## 2024-11-14 DIAGNOSIS — B99.9 RECURRENT INFECTIONS: ICD-10-CM

## 2024-11-14 LAB
FEF 25 75 LLN: 2.46
FEF 25 75 PRE REF: 113.1 %
FEF 25 75 REF: 3.86
FEV05 LLN: 1.52
FEV05 REF: 2.37
FEV1 FVC LLN: 72
FEV1 FVC PRE REF: 107.4 %
FEV1 FVC REF: 83
FEV1 LLN: 2.82
FEV1 PRE REF: 101.1 %
FEV1 REF: 3.53
FEV1FVCZSCORE: 1.09
FEV1ZSCORE: 0.09
FVC LLN: 3.43
FVC PRE REF: 93.4 %
FVC REF: 4.29
FVCZSCORE: -0.54
PEF LLN: 5.71
PEF PRE REF: 71.3 %
PEF REF: 7.64
PRE FEF 25 75: 4.36 L/S (ref 2.46–5.25)
PRE FET 100: 5.26 SEC
PRE FEV05 REF: 106.4 %
PRE FEV1 FVC: 88.96 % (ref 71.65–91.81)
PRE FEV1: 3.57 L (ref 2.82–4.21)
PRE FEV5: 2.52 L (ref 1.52–3.23)
PRE FVC: 4.01 L (ref 3.43–5.19)
PRE PEF: 5.45 L/S (ref 5.71–9.57)

## 2024-11-14 PROCEDURE — 99999 PR PBB SHADOW E&M-EST. PATIENT-LVL V: CPT | Mod: PBBFAC,,, | Performed by: STUDENT IN AN ORGANIZED HEALTH CARE EDUCATION/TRAINING PROGRAM

## 2024-11-14 RX ORDER — HYDROXYCHLOROQUINE SULFATE 200 MG/1
200 TABLET, FILM COATED ORAL DAILY
COMMUNITY

## 2024-11-14 RX ORDER — VORTIOXETINE 20 MG/1
20 TABLET, FILM COATED ORAL
COMMUNITY

## 2024-11-14 RX ORDER — CETIRIZINE HYDROCHLORIDE 10 MG/1
10 TABLET ORAL DAILY
COMMUNITY

## 2024-11-14 RX ORDER — MELOXICAM 15 MG/1
15 TABLET ORAL DAILY
COMMUNITY

## 2024-11-14 NOTE — PROGRESS NOTES
Allergy and Immunology  Procedure Note     Spirometry  Date FEV1 (L)  (% predicted) FVC (L)  (% predicted) FEV1/ FVC LBV15-20%  (% predicted)   11/14/2024 101% 93% 89% 113%                 Interpretation: Normal spirometry.     Ezequiel Welch MD   [facility]  Allergy and Clinical Immunology

## 2024-11-14 NOTE — PROGRESS NOTES
Allergy and Immunology  Established Patient Clinic Note    Date: 11/14/2024  Chief Complaint   Patient presents with    Follow-up     History  Dhara Amezcua is a 34 y.o. female being seen for follow-up today.    Recurrent Infections   NK Cell Deficiency  - EBV - concern for chronic mono  - No infections reported since prior appointment  - Possible Azathioprine as etiology but to reassess       Initial HPI:   - Prior hx of sinus sx and FESS   - Multiple sinus infections per year   - Started more in adulthood - especially in college     Chronic Nonallergic Rhinitis   - No acute complaints      Moderate Persistent Asthma?  Shortness of Breath   Exercise Induced Asthma   - Trial of Trelegy for SOB      Food Allergy  - 08/21/2024: Serum IgE negative to catfish, codfish, salmon, and tuna  - Patient does not want an oral challenge at this time   - Concern for [place] and [place] allergy   - Swelling and hives in childhood   - Patient tolerates shellfish      Drug Allergy  - See allergy tab for positives  - To be addressed more at next appointment      Dysautonomia/POTS  EDS/RLS   - No tryptase on file - mast cell to rule out  - Gabapentin added by Rheumatology with interval improvement     Allergies, PMH, PSH, Social, and Family History were reviewed.    Current Outpatient Medications on File Prior to Visit   Medication Sig Dispense Refill    ALPRAZolam (XANAX) 0.5 MG tablet Take 0.5 mg by mouth daily as needed for Anxiety.      azelastine (ASTELIN) 137 mcg (0.1 %) nasal spray 1 spray (137 mcg total) by Nasal route 2 (two) times daily. 30 mL 11    busPIRone (BUSPAR) 15 MG tablet Take 15 mg by mouth 2 (two) times daily.      cetirizine (ZYRTEC) 10 MG tablet Take 10 mg by mouth once daily.      fluticasone-umeclidin-vilanter (TRELEGY ELLIPTA) 200-62.5-25 mcg inhaler Inhale 1 puff into the lungs once daily. 60 each 11    hydroxychloroquine (PLAQUENIL) 200 mg tablet Take 200 mg by mouth once daily.      hyoscyamine sulfate  (HYOSYNE ORAL) Take by mouth.      meclizine (ANTIVERT) 25 mg tablet Take 1 tablet (25 mg total) by mouth 3 (three) times daily as needed. 25 tablet 0    meloxicam (MOBIC) 15 MG tablet Take 15 mg by mouth once daily.      vitamin D (VITAMIN D3) 1000 units Tab Take 1,000 Units by mouth once daily.      vortioxetine (TRINTELLIX) 20 mg Tab Take 20 mg by mouth.      albuterol-budesonide (AIRSUPRA) 90-80 mcg/actuation Inhale 2 puffs into the lungs every 4 (four) hours. (Patient not taking: Reported on 11/14/2024) 10.7 g 11    azaTHIOprine (IMURAN) 50 mg Tab Take 25 mg by mouth 2 (two) times a day. (Patient not taking: Reported on 11/14/2024)      pantoprazole (PROTONIX) 40 MG tablet Take 1 tablet (40 mg total) by mouth once daily. (Patient not taking: Reported on 11/14/2024) 60 tablet 3     No current facility-administered medications on file prior to visit.     Physical Examination  Vitals:    11/14/24 0735   BP: 125/85   Pulse: 88   Temp: 97.5 °F (36.4 °C)     GENERAL:  female in no apparent distress and well developed and well nourished  HEAD:  Normocephalic, without obvious abnormality, atraumatic  EYES: sclera anicteric, conjunctiva normochromic  EARS: normal TM's and external ear canals both ears  NOSE: without erythema or discharge, clear discharge, turbinates normal    OROPHARYNX: moist mucous membranes without erythema, exudates or petechiae  LYMPH NODES: normal, supple, no lymphadenopathy  LUNGS: clear to auscultation, no wheezes, rales or rhonchi, symmetric air entry.  HEART: normal rate, regular rhythm, normal S1, S2, no murmurs, rubs, clicks or gallops.  ABDOMEN: soft, nontender, nondistended, no masses or organomegaly.  MUSCULOSKELETAL: no gross joint deformity or swelling.  NEURO: alert, oriented, normal speech, no focal findings or movement disorder noted.  SKIN: normal coloration and turgor, no rashes, no suspicious skin lesions noted.     Assessment/Plan:   Problem List Items Addressed This Visit        Exercise-induced asthma    Relevant Orders    Spirometry without Bronchodilator (Completed)    Chronic nonallergic rhinitis    Overview     - 08/21/2024: Serum IgE to Zone 6 Aeroallergens negative          Recurrent infections    Overview     - 08/21/2024: Administered PPSV23     - Prior hx of sinus sx and FESS   - Multiple sinus infections per year   - Started more in adulthood - especially in college            Relevant Medications    pneumococcal vaccine (PNEUMOVAX-23) injection 0.5 mL (Completed)    Dysautonomia    POTS (postural orthostatic tachycardia syndrome)    Moderate persistent asthma without complication - Primary    Relevant Orders    Spirometry without Bronchodilator (Completed)    Natural killer (NK) cell deficiency     Follow up:  Follow up in about 2 months (around 1/14/2025).    Greater than 40 minutes spent on the complex healthcare of this patient     - Repeating PPSV23 to serve as a booster   - At next appointment Pneum 23 titers and Flow cytometry  - Low NK may be 2/2 to Azathioprine   - Trelegy with interval improvement   - Recommended air purifiers at this time   - Mold evaluation is always beneficial     Ezequiel Welch MD   [facility]  Allergy and Immunology

## 2025-01-09 ENCOUNTER — OFFICE VISIT (OUTPATIENT)
Dept: ALLERGY | Facility: CLINIC | Age: 35
End: 2025-01-09
Payer: COMMERCIAL

## 2025-01-09 ENCOUNTER — LAB VISIT (OUTPATIENT)
Dept: LAB | Facility: HOSPITAL | Age: 35
End: 2025-01-09
Attending: STUDENT IN AN ORGANIZED HEALTH CARE EDUCATION/TRAINING PROGRAM
Payer: COMMERCIAL

## 2025-01-09 VITALS
SYSTOLIC BLOOD PRESSURE: 128 MMHG | BODY MASS INDEX: 39.46 KG/M2 | DIASTOLIC BLOOD PRESSURE: 84 MMHG | WEIGHT: 260.38 LBS | HEIGHT: 68 IN | HEART RATE: 89 BPM | OXYGEN SATURATION: 96 % | TEMPERATURE: 98 F

## 2025-01-09 DIAGNOSIS — B99.9 RECURRENT INFECTIONS: ICD-10-CM

## 2025-01-09 DIAGNOSIS — J45.40 MODERATE PERSISTENT ASTHMA WITHOUT COMPLICATION: ICD-10-CM

## 2025-01-09 DIAGNOSIS — J31.0 CHRONIC NONALLERGIC RHINITIS: ICD-10-CM

## 2025-01-09 DIAGNOSIS — J47.9 BRONCHIECTASIS WITHOUT COMPLICATION: ICD-10-CM

## 2025-01-09 DIAGNOSIS — B99.9 RECURRENT INFECTIONS: Primary | ICD-10-CM

## 2025-01-09 PROCEDURE — 3008F BODY MASS INDEX DOCD: CPT | Mod: CPTII,S$GLB,, | Performed by: STUDENT IN AN ORGANIZED HEALTH CARE EDUCATION/TRAINING PROGRAM

## 2025-01-09 PROCEDURE — 3074F SYST BP LT 130 MM HG: CPT | Mod: CPTII,S$GLB,, | Performed by: STUDENT IN AN ORGANIZED HEALTH CARE EDUCATION/TRAINING PROGRAM

## 2025-01-09 PROCEDURE — 1159F MED LIST DOCD IN RCRD: CPT | Mod: CPTII,S$GLB,, | Performed by: STUDENT IN AN ORGANIZED HEALTH CARE EDUCATION/TRAINING PROGRAM

## 2025-01-09 PROCEDURE — 86581 STRPTCS PNEUM ANTB SEROT IA: CPT | Performed by: STUDENT IN AN ORGANIZED HEALTH CARE EDUCATION/TRAINING PROGRAM

## 2025-01-09 PROCEDURE — 86359 T CELLS TOTAL COUNT: CPT | Performed by: STUDENT IN AN ORGANIZED HEALTH CARE EDUCATION/TRAINING PROGRAM

## 2025-01-09 PROCEDURE — 99999 PR PBB SHADOW E&M-EST. PATIENT-LVL V: CPT | Mod: PBBFAC,,, | Performed by: STUDENT IN AN ORGANIZED HEALTH CARE EDUCATION/TRAINING PROGRAM

## 2025-01-09 PROCEDURE — 3079F DIAST BP 80-89 MM HG: CPT | Mod: CPTII,S$GLB,, | Performed by: STUDENT IN AN ORGANIZED HEALTH CARE EDUCATION/TRAINING PROGRAM

## 2025-01-09 PROCEDURE — 36415 COLL VENOUS BLD VENIPUNCTURE: CPT | Performed by: STUDENT IN AN ORGANIZED HEALTH CARE EDUCATION/TRAINING PROGRAM

## 2025-01-09 PROCEDURE — 99214 OFFICE O/P EST MOD 30 MIN: CPT | Mod: S$GLB,,, | Performed by: STUDENT IN AN ORGANIZED HEALTH CARE EDUCATION/TRAINING PROGRAM

## 2025-01-09 PROCEDURE — 1160F RVW MEDS BY RX/DR IN RCRD: CPT | Mod: CPTII,S$GLB,, | Performed by: STUDENT IN AN ORGANIZED HEALTH CARE EDUCATION/TRAINING PROGRAM

## 2025-01-09 NOTE — PROGRESS NOTES
Allergy and Immunology  Established Patient Clinic Note    Date: 1/9/2025  Chief Complaint   Patient presents with    Follow-up     History  Dhara Amezcua is a 34 y.o. female being seen for follow-up today.    Recurrent Infections   NK Cell Deficiency  - No infections since prior appointment  - Repeating flow cytometry and Pneum 23 titers to assess for response to PPSV 23      Initial HPI:   - Prior hx of sinus sx and FESS   - Multiple sinus infections per year   - Started more in adulthood - especially in college     Chronic Nonallergic Rhinitis   - No acute complaints      Moderate Persistent Asthma?  Shortness of Breath   Exercise Induced Asthma   - Trial of Trelegy for SOB with interval improvement      Food Allergy  - 08/21/2024: Serum IgE negative to catfish, codfish, salmon, and tuna  - Patient does not want an oral challenge at this time   - Concern for [place] and [place] allergy   - Swelling and hives in childhood   - Patient tolerates shellfish      Drug Allergy  - See allergy tab for positives  - To be addressed more at next appointment      Dysautonomia/POTS  EDS/RLS   - No tryptase on file - mast cell to rule out  - Gabapentin added by Rheumatology with interval improvement      Allergies, PMH, PSH, Social, and Family History were reviewed.    Current Outpatient Medications on File Prior to Visit   Medication Sig Dispense Refill    ALPRAZolam (XANAX) 0.5 MG tablet Take 0.5 mg by mouth daily as needed for Anxiety.      azelastine (ASTELIN) 137 mcg (0.1 %) nasal spray 1 spray (137 mcg total) by Nasal route 2 (two) times daily. 30 mL 11    busPIRone (BUSPAR) 15 MG tablet Take 15 mg by mouth 2 (two) times daily.      cetirizine (ZYRTEC) 10 MG tablet Take 10 mg by mouth once daily.      fluticasone-umeclidin-vilanter (TRELEGY ELLIPTA) 200-62.5-25 mcg inhaler Inhale 1 puff into the lungs once daily. 60 each 11    hydroxychloroquine (PLAQUENIL) 200 mg tablet Take 200 mg by mouth once daily.      hyoscyamine  sulfate (HYOSYNE ORAL) Take by mouth.      meclizine (ANTIVERT) 25 mg tablet Take 1 tablet (25 mg total) by mouth 3 (three) times daily as needed. 25 tablet 0    meloxicam (MOBIC) 15 MG tablet Take 15 mg by mouth once daily.      vitamin D (VITAMIN D3) 1000 units Tab Take 1,000 Units by mouth once daily.      vortioxetine (TRINTELLIX) 20 mg Tab Take 20 mg by mouth.       No current facility-administered medications on file prior to visit.     Physical Examination  Vitals:    01/09/25 0845   BP: 128/84   Pulse: 89   Temp: 97.7 °F (36.5 °C)     GENERAL:  female in no apparent distress and well developed and well nourished  HEAD:  Normocephalic, without obvious abnormality, atraumatic  EYES: sclera anicteric, conjunctiva normochromic  EARS: normal TM's and external ear canals both ears  NOSE: without erythema or discharge, clear discharge, turbinates normal    OROPHARYNX: moist mucous membranes without erythema, exudates or petechiae  LYMPH NODES: normal, supple, no lymphadenopathy  LUNGS: clear to auscultation, no wheezes, rales or rhonchi, symmetric air entry.  HEART: normal rate, regular rhythm, normal S1, S2, no murmurs, rubs, clicks or gallops.  ABDOMEN: soft, nontender, nondistended, no masses or organomegaly.  MUSCULOSKELETAL: no gross joint deformity or swelling.  NEURO: alert, oriented, normal speech, no focal findings or movement disorder noted.  SKIN: normal coloration and turgor, no rashes, no suspicious skin lesions noted.     Assessment/Plan:   Problem List Items Addressed This Visit       Chronic nonallergic rhinitis    Overview     - 08/21/2024: Serum IgE to Zone 6 Aeroallergens negative          Recurrent infections - Primary    Overview     - 08/21/2024: Administered PPSV23     - Prior hx of sinus sx and FESS   - Multiple sinus infections per year   - Started more in adulthood - especially in college            Current Assessment & Plan     - Repeat flow cytometry and PPSV23          Relevant Orders     Streptococcus Pneumoniae IgG Antibody (23 Serotypes), MAID    Lynphocyte Subset Panel 7 - Congenital Immunodeficiencies    Moderate persistent asthma without complication    Current Assessment & Plan     - Improved, no acute complaints   - Continue Trelegy and Albuterol PRN  - Will continue to monitor and reassess          Relevant Orders    Ambulatory referral/consult to Pulmonology     Other Visit Diagnoses       Bronchiectasis without complication        Relevant Orders    Ambulatory referral/consult to Pulmonology          Follow up:  Follow up in about 3 months (around 4/9/2025).    Ezequiel Welch MD   [facility]  Allergy and Immunology

## 2025-01-09 NOTE — ASSESSMENT & PLAN NOTE
- Improved, no acute complaints   - Continue Trelegy and Albuterol PRN  - Will continue to monitor and reassess

## 2025-01-10 ENCOUNTER — TELEPHONE (OUTPATIENT)
Dept: SURGERY | Facility: CLINIC | Age: 35
End: 2025-01-10
Payer: COMMERCIAL

## 2025-01-11 LAB
ANNOTATION COMMENT IMP: ABNORMAL
CD19 CELLS NFR SPEC: 5 % (ref 6–23)
CD2 CELLS # BLD: 1108 CELLS/UL (ref 700–2600)
CD2 CELLS NFR BLD: 93 % (ref 73–91)
CD3 CELLS # BLD: 1061 CELLS/UL (ref 570–2400)
CD3 CELLS NFR SPEC: 85 % (ref 62–87)
CD3+CD4+ CELLS # BLD: 670 CELLS/UL (ref 430–1800)
CD3+CD4+ CELLS NFR BLD: 53 % (ref 32–64)
CD3+CD4+ CELLS/CD3+CD8+ CLL BLD: 1.77 RATIO (ref 0.8–3.9)
CD3+CD8+ CELLS # BLD: 377 CELLS/UL (ref 210–1200)
CD3+CD8+ CELLS NFR SPEC: 30 % (ref 15–46)
CD3-CD16+CD56+ CELLS # SPEC: 120 CELLS/UL (ref 78–470)
CD3-CD16+CD56+ CELLS NFR SPEC: 10 % (ref 4–26)
CD4+CD45RA+ CELLS # BLD: 296 CELLS/UL (ref 150–870)
CD4+CD45RO+ CELLS # BLD: 336 CELLS/UL (ref 190–1050)
CD4+CD45RO+ CELLS NFR BLD: 53 % (ref 28–72)
CD45RA CELLS NFR BLD: 47 % (ref 28–71)
CELLS.CD3-CD19+ [#/VOLUME] IN BLOOD: 57 CELLS/UL (ref 91–610)
HLA-DR+ CELLS # BLD: 57 CELLS/UL (ref 100–640)
HLA-DR+ CELLS NFR BLD: 5 % (ref 8–24)

## 2025-01-12 ENCOUNTER — PATIENT MESSAGE (OUTPATIENT)
Dept: ALLERGY | Facility: CLINIC | Age: 35
End: 2025-01-12
Payer: COMMERCIAL

## 2025-01-14 LAB
IMMUNOLOGIST REVIEW: NORMAL
S PN DA SERO 19F IGG SER-MCNC: 0.5 MCG/ML
S PNEUM DA 1 IGG SER-MCNC: 1.2 MCG/ML
S PNEUM DA 10A IGG SER-MCNC: 1.7 MCG/ML
S PNEUM DA 11A IGG SER-MCNC: 0.4 MCG/ML
S PNEUM DA 12F IGG SER-MCNC: 0.4 MCG/ML
S PNEUM DA 14 IGG SER-MCNC: 0.4 MCG/ML
S PNEUM DA 15B IGG SER-MCNC: 0.6 MCG/ML
S PNEUM DA 17F IGG SER-MCNC: 0.9 MCG/ML
S PNEUM DA 18C IGG SER-MCNC: 0.1 MCG/ML
S PNEUM DA 19A IGG SER-MCNC: 0.6 MCG/ML
S PNEUM DA 2 IGG SER-MCNC: 10.5 MCG/ML
S PNEUM DA 20A IGG SER-MCNC: 1.5 MCG/ML
S PNEUM DA 22F IGG SER-MCNC: 3.3 MCG/ML
S PNEUM DA 23F IGG SER-MCNC: 0.2 MCG/ML
S PNEUM DA 3 IGG SER-MCNC: 0.1 MCG/ML
S PNEUM DA 33F IGG SER-MCNC: 1.1 MCG/ML
S PNEUM DA 4 IGG SER-MCNC: 9.9 MCG/ML
S PNEUM DA 5 IGG SER-MCNC: 0.1 MCG/ML
S PNEUM DA 6B IGG SER-MCNC: 0.2 MCG/ML
S PNEUM DA 7F IGG SER-MCNC: 0.2 MCG/ML
S PNEUM DA 8 IGG SER-MCNC: 2.5 MCG/ML
S PNEUM DA 9N IGG SER-MCNC: 4.1 MCG/ML
S PNEUM DA 9V IGG SER-MCNC: 0.7 MCG/ML

## 2025-01-15 ENCOUNTER — PATIENT MESSAGE (OUTPATIENT)
Dept: ALLERGY | Facility: CLINIC | Age: 35
End: 2025-01-15
Payer: COMMERCIAL

## 2025-02-12 ENCOUNTER — PATIENT MESSAGE (OUTPATIENT)
Dept: ALLERGY | Facility: CLINIC | Age: 35
End: 2025-02-12
Payer: COMMERCIAL

## 2025-04-01 ENCOUNTER — OFFICE VISIT (OUTPATIENT)
Dept: PULMONOLOGY | Facility: CLINIC | Age: 35
End: 2025-04-01
Payer: COMMERCIAL

## 2025-04-01 VITALS
HEIGHT: 68 IN | WEIGHT: 255.5 LBS | DIASTOLIC BLOOD PRESSURE: 70 MMHG | BODY MASS INDEX: 38.72 KG/M2 | SYSTOLIC BLOOD PRESSURE: 122 MMHG | OXYGEN SATURATION: 99 % | HEART RATE: 96 BPM | RESPIRATION RATE: 16 BRPM

## 2025-04-01 DIAGNOSIS — R06.02 SHORTNESS OF BREATH: Primary | ICD-10-CM

## 2025-04-01 DIAGNOSIS — J45.40 MODERATE PERSISTENT ASTHMA WITHOUT COMPLICATION: ICD-10-CM

## 2025-04-01 DIAGNOSIS — J47.9 BRONCHIECTASIS WITHOUT COMPLICATION: ICD-10-CM

## 2025-04-01 PROCEDURE — 99999 PR PBB SHADOW E&M-EST. PATIENT-LVL V: CPT | Mod: PBBFAC,,, | Performed by: STUDENT IN AN ORGANIZED HEALTH CARE EDUCATION/TRAINING PROGRAM

## 2025-04-01 PROCEDURE — 3008F BODY MASS INDEX DOCD: CPT | Mod: CPTII,S$GLB,, | Performed by: STUDENT IN AN ORGANIZED HEALTH CARE EDUCATION/TRAINING PROGRAM

## 2025-04-01 PROCEDURE — 99203 OFFICE O/P NEW LOW 30 MIN: CPT | Mod: S$GLB,,, | Performed by: STUDENT IN AN ORGANIZED HEALTH CARE EDUCATION/TRAINING PROGRAM

## 2025-04-01 PROCEDURE — 3078F DIAST BP <80 MM HG: CPT | Mod: CPTII,S$GLB,, | Performed by: STUDENT IN AN ORGANIZED HEALTH CARE EDUCATION/TRAINING PROGRAM

## 2025-04-01 PROCEDURE — 1159F MED LIST DOCD IN RCRD: CPT | Mod: CPTII,S$GLB,, | Performed by: STUDENT IN AN ORGANIZED HEALTH CARE EDUCATION/TRAINING PROGRAM

## 2025-04-01 PROCEDURE — 3074F SYST BP LT 130 MM HG: CPT | Mod: CPTII,S$GLB,, | Performed by: STUDENT IN AN ORGANIZED HEALTH CARE EDUCATION/TRAINING PROGRAM

## 2025-04-01 RX ORDER — PREDNISONE 10 MG/1
10 TABLET ORAL DAILY PRN
COMMUNITY
Start: 2025-03-28

## 2025-04-01 RX ORDER — KETOROLAC TROMETHAMINE 10 MG/1
10 TABLET, FILM COATED ORAL EVERY 8 HOURS PRN
COMMUNITY
Start: 2025-03-17

## 2025-04-01 RX ORDER — ONDANSETRON 8 MG/1
TABLET, ORALLY DISINTEGRATING ORAL
COMMUNITY
Start: 2025-03-28

## 2025-04-01 RX ORDER — VALACYCLOVIR HYDROCHLORIDE 500 MG/1
500 TABLET, FILM COATED ORAL
COMMUNITY
Start: 2024-10-30

## 2025-04-01 RX ORDER — EPINEPHRINE 0.3 MG/.3ML
INJECTION SUBCUTANEOUS
COMMUNITY

## 2025-04-01 RX ORDER — PROMETHAZINE HYDROCHLORIDE 25 MG/1
25 TABLET ORAL
COMMUNITY
Start: 2025-03-28

## 2025-04-01 RX ORDER — ENOXAPARIN SODIUM 100 MG/ML
INJECTION SUBCUTANEOUS
COMMUNITY
Start: 2024-10-30

## 2025-04-01 RX ORDER — GABAPENTIN 100 MG/1
200 CAPSULE ORAL
COMMUNITY
Start: 2024-12-12

## 2025-04-01 NOTE — PROGRESS NOTES
SOB 2 years ago.  Can come at any time.  Drinking fluids or eating may trigger it.  Walking down the hallway, but sometimes she's feeling it without major exercise.    Bendopnea     Pleurisy and leg swelling   SS? And trying to determine other joint co morbidities   HS?? And they drain.  Joint pain, fatigue and PIRES.       Now on trelegy for the last 6 months with some improvement of her symptoms .    Had comprehensive cardiac w/u negative so far.     Chief complaint:  Chief Complaint   Patient presents with    Shortness of Breath    Asthma        History of present illness -  HPI   Dhara comes to clinic today essentially for a 2nd pulmonary opinion.  She was referred by Dr. Welch.  She has been seeing her for what seems to be recurrent infections for a suspicion of natural killer cell deficiency.  Carries a diagnosis of dysautonomia, Sjogren's syndrome, possible hidradenitis suppurativa, as well as a yet to be elucidated autoimmune disease.    She also tells me that she was labeled as having asthma at some point and was put on Trelegy, with some improvement perhaps about 20-30% of her symptoms with inhaler use.    Her current symptoms a pulmonary standpoint include severe dyspnea on exertion, severe shortness of breath that comes even at rest and takes her breath away to the point that sometimes she needs to sit down to catch her breath.  Sometimes he comes with drinking fluids or eating sometimes it happens with walking at a normal pace, but this is inconsistent at sometimes she is able to do a light workout without major problems.    Bendopnea is a major feature and she has associated pleurisy and leg swelling, she has been to a number of specialists including a cardiologist with an extensive negative workup thus far.      She has been a [facility] pulmonologist at [facility] that gave her a diagnosis of mild bronchiectasis.      Symptoms: Shortness of breath   Tobacco use: Lifetime  "non-smoker  Occupational history: Optometrist     Physical examination -   Physical Exam  Vitals and nursing note reviewed.   Constitutional:       Appearance: Normal appearance. She is obese.   HENT:      Head: Normocephalic and atraumatic.      Nose: Nose normal.      Mouth/Throat:      Mouth: Mucous membranes are moist.   Eyes:      Pupils: Pupils are equal, round, and reactive to light.   Cardiovascular:      Rate and Rhythm: Normal rate and regular rhythm.      Pulses: Normal pulses.      Heart sounds: Normal heart sounds.   Pulmonary:      Effort: Pulmonary effort is normal.      Breath sounds: Normal breath sounds.   Abdominal:      General: Abdomen is flat.      Palpations: Abdomen is soft.   Musculoskeletal:         General: No swelling.   Skin:     General: Skin is warm.      Capillary Refill: Capillary refill takes less than 2 seconds.   Neurological:      General: No focal deficit present.      Mental Status: She is alert.        Vitals:    04/01/25 1339   BP: 122/70   Patient Position: Sitting   Pulse: 96   Resp: 16   SpO2: 99%   Weight: 115.9 kg (255 lb 8.2 oz)   Height: 5' 8" (1.727 m)      Review of Systems   Constitutional: Negative.    HENT: Negative.     Eyes: Negative.    Respiratory:  Positive for shortness of breath.    Cardiovascular: Negative.    Gastrointestinal: Negative.    Musculoskeletal: Negative.    Skin: Negative.    Neurological: Negative.        Relevant data (Independently reviewed by me) -    Prior notes -   Rheumatology, Allergy and immunology    Labs -   Nonpertinent to my assessment    Spirometry -  Multiple spirometries all normal, including one in 11/14/2024 with our system that showed an FVC of 93% of predicted, FEV1 of 101% of predicted and a normal ratio.  Even her FEF 25 75% was normal    Imaging -   04/18/2023 CT angiography was normal, she reportedly had a subsequent CT that supposedly showed bronchiectasis    Assessment & Plan    Shortness of breath    Moderate " persistent asthma without complication  -     Ambulatory referral/consult to Pulmonology    Bronchiectasis without complication  -     Ambulatory referral/consult to Pulmonology    This is a very challenging case on a patient with dramatic respiratory symptoms namely shortness of breath that comes at any time sometimes related to exertion, sometimes not.      She carries a long list of diagnosis a lot of them autoimmune in nature, including possible immunodeficiencies being taking care of by allergy and immunology.     She was advised to continue to take her inhaler as she seems to be having benefit from ongoing usage, I am unsure whether the patient has asthma or not, but regardless of the fact this will not explain her current symptoms.  I was not able to identify bronchiectasis in the CT scan available for my review, she was advised to bring the repeat CT scan of the chest for me to go over it but even if this shows mild bronchiectasis it will still not explain her symptoms.      I will have to perhaps wait for ongoing rheumatological investigations as she has been off of her immunosuppressive regimen for a number of months now, I have to presume that some of her symptoms are probably related to her autoimmune disease not been adequately controlled she still has residual joint pain and active possible HS.    I see no benefit and repeating some of the testing that she already had done, I do not see a role for cardiopulmonary exercise test as the symptoms are not always associated with exertion.    I do not see how further investigations or interventions from my behalf we will benefit this patient.    RTC as needed    Toño Rogers   04/02/2025

## 2025-04-02 PROBLEM — R06.02 SHORTNESS OF BREATH: Status: ACTIVE | Noted: 2025-04-02

## 2025-04-10 ENCOUNTER — OFFICE VISIT (OUTPATIENT)
Dept: ALLERGY | Facility: CLINIC | Age: 35
End: 2025-04-10
Payer: COMMERCIAL

## 2025-04-10 VITALS
OXYGEN SATURATION: 99 % | HEIGHT: 67 IN | HEART RATE: 93 BPM | SYSTOLIC BLOOD PRESSURE: 154 MMHG | DIASTOLIC BLOOD PRESSURE: 63 MMHG | WEIGHT: 258.19 LBS | BODY MASS INDEX: 40.52 KG/M2

## 2025-04-10 DIAGNOSIS — L73.2 SUPPURATIVE HIDRADENITIS: ICD-10-CM

## 2025-04-10 DIAGNOSIS — R06.02 SHORTNESS OF BREATH: Primary | ICD-10-CM

## 2025-04-10 PROCEDURE — 99999 PR PBB SHADOW E&M-EST. PATIENT-LVL V: CPT | Mod: PBBFAC,,, | Performed by: STUDENT IN AN ORGANIZED HEALTH CARE EDUCATION/TRAINING PROGRAM

## 2025-04-10 NOTE — PROGRESS NOTES
Allergy and Immunology  Established Patient Clinic Note    Date: 4/10/2025  Chief Complaint   Patient presents with    Allergies     History  Dhara Amezcua is a 34 y.o. female being seen for follow-up today.    Recurrent Infections   NK Cell Deficiency (Resolved)  - No infections or steroids since prior appointment   - NK cells recovered - no underlying genetic concern   - Repeat PPSV23 at this time      Initial HPI:   - Prior hx of sinus sx and FESS   - Multiple sinus infections per year   - Started more in adulthood - especially in college     Chronic Nonallergic Rhinitis   - No acute complaints      Moderate Persistent Asthma?  Shortness of Breath   Exercise Induced Asthma\  Dysautonomia/POTS?   - Trial of Trelegy for SOB with interval improvement (not significant)   - Pulmonology reviewed and signed off at this time   - Referral to Cardiology at this time      Drug Allergy  - See allergy tab for positives  - To be addressed more at next appointment     Allergies, PMH, PSH, Social, and Family History were reviewed.    Medications Ordered Prior to Encounter[1]    Physical Examination  Vitals:    04/10/25 0730   BP: (!) 154/63   Pulse: 93     GENERAL:  female in no apparent distress and well developed and well nourished  HEAD:  Normocephalic, without obvious abnormality, atraumatic  EYES: sclera anicteric, conjunctiva normochromic  EARS: normal TM's and external ear canals both ears  NOSE: without erythema or discharge, clear discharge, turbinates normal    OROPHARYNX: moist mucous membranes without erythema, exudates or petechiae  LYMPH NODES: normal, supple, no lymphadenopathy  LUNGS: clear to auscultation, no wheezes, rales or rhonchi, symmetric air entry.  HEART: normal rate, regular rhythm, normal S1, S2, no murmurs, rubs, clicks or gallops.  ABDOMEN: soft, nontender, nondistended, no masses or organomegaly.  MUSCULOSKELETAL: no gross joint deformity or swelling.  NEURO: alert, oriented, normal speech, no  S-(situation): lymph node    B-(background): she had a slightly swollen lymph node in April and still present today.     A-(assessment): lymph node is still present. Fever since Saturday. Cough and runny nose. No rash. No concerns about breathing. Mom is only concerned about lymph node only.     R-(recommendations): advised mom this is likely from the possible virus that she has. Mom agrees and will continue to observe from home. She will call with further questions or concerns.     Judit Diez RN       focal findings or movement disorder noted.  SKIN: normal coloration and turgor, no rashes, no suspicious skin lesions noted.     Assessment/Plan:   Problem List Items Addressed This Visit       Shortness of breath - Primary    Relevant Medications    pneumococcal vaccine (PNEUMOVAX-23) injection 0.5 mL (Completed)    Other Relevant Orders    Ambulatory referral/consult to Cardiology     Other Visit Diagnoses         Suppurative hidradenitis        Relevant Orders    Ambulatory referral/consult to Dermatology          - Referral to Cardiology for eval of SOB and dysautonomia/POTS?  - HS - surprising calm at this time but wanting Derm establishment   - Recurrent infections - PPSV23 at this time   - ED precautions and inhaler education     Follow up:  Follow up in about 2 months (around 6/10/2025).    40+ minutes spent on the direct and indirect healthcare of this patient.  Additional timing to review records, discuss referral to Derm and Cardiology, and Immune eval.     Kesler Bourgoyne, MD [facility]  Allergy and Immunology        [1]   Current Outpatient Medications on File Prior to Visit   Medication Sig Dispense Refill    ALPRAZolam (XANAX) 0.5 MG tablet Take 0.5 mg by mouth daily as needed for Anxiety.      azelastine (ASTELIN) 137 mcg (0.1 %) nasal spray 1 spray (137 mcg total) by Nasal route 2 (two) times daily. 30 mL 11    busPIRone (BUSPAR) 15 MG tablet Take 15 mg by mouth 2 (two) times daily.      cetirizine (ZYRTEC) 10 MG tablet Take 10 mg by mouth once daily.      enoxaparin (LOVENOX) 30 mg/0.3 mL Syrg SMARTSI.3 Milliliter(s) SUB-Q Every 12 Hours      EPINEPHrine (EPIPEN) 0.3 mg/0.3 mL AtIn Inject as directed.      fluticasone-umeclidin-vilanter (TRELEGY ELLIPTA) 200-62.5-25 mcg inhaler Inhale 1 puff into the lungs once daily. 60 each 11    gabapentin (NEURONTIN) 100 MG capsule Take 200 mg by mouth.      hydroxychloroquine (PLAQUENIL) 200 mg tablet Take 200 mg by mouth once daily.       hyoscyamine sulfate (HYOSYNE ORAL) Take by mouth.      ketorolac (TORADOL) 10 mg tablet Take 10 mg by mouth every 8 (eight) hours as needed.      meclizine (ANTIVERT) 25 mg tablet Take 1 tablet (25 mg total) by mouth 3 (three) times daily as needed. 25 tablet 0    meloxicam (MOBIC) 15 MG tablet Take 15 mg by mouth once daily.      ondansetron (ZOFRAN-ODT) 8 MG TbDL Take by mouth.      predniSONE (DELTASONE) 10 MG tablet Take 10 mg by mouth daily as needed.      promethazine (PHENERGAN) 25 MG tablet Take 25 mg by mouth.      valACYclovir (VALTREX) 500 MG tablet Take 500 mg by mouth.      vitamin D (VITAMIN D3) 1000 units Tab Take 1,000 Units by mouth once daily.      vortioxetine (TRINTELLIX) 20 mg Tab Take 20 mg by mouth.       No current facility-administered medications on file prior to visit.

## 2025-04-11 ENCOUNTER — PATIENT MESSAGE (OUTPATIENT)
Dept: CARDIOLOGY | Facility: CLINIC | Age: 35
End: 2025-04-11
Payer: COMMERCIAL

## 2025-04-28 DIAGNOSIS — R00.0 TACHYCARDIA: Primary | ICD-10-CM

## 2025-05-02 ENCOUNTER — OFFICE VISIT (OUTPATIENT)
Dept: CARDIOLOGY | Facility: CLINIC | Age: 35
End: 2025-05-02
Payer: COMMERCIAL

## 2025-05-02 ENCOUNTER — HOSPITAL ENCOUNTER (OUTPATIENT)
Dept: CARDIOLOGY | Facility: HOSPITAL | Age: 35
Discharge: HOME | End: 2025-05-02
Attending: INTERNAL MEDICINE
Payer: COMMERCIAL

## 2025-05-02 VITALS
OXYGEN SATURATION: 96 % | BODY MASS INDEX: 39.85 KG/M2 | WEIGHT: 254.44 LBS | WEIGHT: 254.44 LBS | HEART RATE: 89 BPM | HEIGHT: 67 IN | BODY MASS INDEX: 39.93 KG/M2 | DIASTOLIC BLOOD PRESSURE: 88 MMHG | SYSTOLIC BLOOD PRESSURE: 124 MMHG

## 2025-05-02 DIAGNOSIS — J45.40 MODERATE PERSISTENT ASTHMA WITHOUT COMPLICATION: ICD-10-CM

## 2025-05-02 DIAGNOSIS — E66.01 SEVERE OBESITY (BMI 35.0-39.9) WITH COMORBIDITY: ICD-10-CM

## 2025-05-02 DIAGNOSIS — M35.00 SJOGREN'S SYNDROME, WITH UNSPECIFIED ORGAN INVOLVEMENT: ICD-10-CM

## 2025-05-02 DIAGNOSIS — R06.02 SHORTNESS OF BREATH: ICD-10-CM

## 2025-05-02 DIAGNOSIS — E53.8 B12 DEFICIENCY: ICD-10-CM

## 2025-05-02 DIAGNOSIS — D50.9 IRON DEFICIENCY ANEMIA, UNSPECIFIED IRON DEFICIENCY ANEMIA TYPE: ICD-10-CM

## 2025-05-02 DIAGNOSIS — J45.990 EXERCISE-INDUCED ASTHMA: ICD-10-CM

## 2025-05-02 DIAGNOSIS — R00.0 TACHYCARDIA: Primary | ICD-10-CM

## 2025-05-02 DIAGNOSIS — E55.9 VITAMIN D DEFICIENCY: ICD-10-CM

## 2025-05-02 DIAGNOSIS — G90.1 DYSAUTONOMIA: ICD-10-CM

## 2025-05-02 DIAGNOSIS — R00.0 TACHYCARDIA: ICD-10-CM

## 2025-05-02 LAB
OHS QRS DURATION: 96 MS
OHS QTC CALCULATION: 408 MS

## 2025-05-02 PROCEDURE — 93010 ELECTROCARDIOGRAM REPORT: CPT | Mod: ,,, | Performed by: INTERNAL MEDICINE

## 2025-05-02 PROCEDURE — 93005 ELECTROCARDIOGRAM TRACING: CPT

## 2025-05-02 PROCEDURE — 99999 PR PBB SHADOW E&M-EST. PATIENT-LVL V: CPT | Mod: PBBFAC,,, | Performed by: INTERNAL MEDICINE

## 2025-05-02 RX ORDER — PROPRANOLOL HYDROCHLORIDE 20 MG/1
20 TABLET ORAL 3 TIMES DAILY PRN
Qty: 90 TABLET | Refills: 3 | Status: SHIPPED | OUTPATIENT
Start: 2025-05-02 | End: 2026-05-02

## 2025-05-02 RX ORDER — IRON,CARBONYL/ASCORBIC ACID 100-250 MG
1 TABLET ORAL DAILY
Qty: 90 TABLET | Refills: 1 | Status: SHIPPED | OUTPATIENT
Start: 2025-05-02

## 2025-05-02 NOTE — PROGRESS NOTES
Subjective:   Patient ID:  Dhara Amezcua is a 34 y.o. female who presents for cardiac consult of Chest Pain (2 weeks ago), Dizziness, and Shortness of Breath      Referral by: Sincere  [address] [place] [address]     Reason for consult: SOB      HPI  The patient came in today for cardiac consult of Chest Pain (2 weeks ago), Dizziness, and Shortness of Breath      Dhara Amezcua is a 34 y.o. female pt with asthma, obesity, h/o Sjorgrens, allergic rhinitis, dysutonomia presents for CVD eval.     10/24/2023 Toñito Ferreira MD   Patient presents to the clinic today as a new patient for a second opinion after recent extensive cardiac testing with a history of Sjogren syndrome and complaints of chest pain and shortness of breath. I reviewed all of the patient's test and records noting no significant abnormalities with negative stress test, calcium score of 0, echocardiogram showing normal systolic and diastolic function with normal valvular function. No significant duction abnormalities on ECG and normal sinus rhythm. Repeat ECG today also shows normal sinus rhythm with no acute or chronic ischemia or infarction. No indication for further testing. Patient is planning to follow-up with her primary cardiologist.     5/2/25  BP and HR stable today. BMI 39 - 254 lbs   Pt had eval with allergist referred here for further eval.   Dony monitor in past  - NSR, rare ectopy  Pt has been having more edema, palpitations, elevated HR for 2 years appx - has FH blood clots as well  She has been to ERs multiple times without definitive etiology  CA score neg, neg tilt table test.     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; no h/o DVTs/PEs; strong family h/o DVTs; w/u resulted in MTFHR and prothrombin gene mutation    Family history of breast cancer 02/14/2024    Gout     Hearing loss     Hypothyroidism     Hypothyroidism-2014    Meniere's disease, unspecified ear     Myopia     Myopia of both eyes    Osteoarthrosis, unspecified whether generalized or localized     Other chronic nonalcoholic liver disease     :Fatty liver disease, nonalcoholic-2014 - seen on u/s; rhem in [place] reports that there was hepatocellular disease; hepatitis w/u is wnl    Polycystic ovaries     Primary hypercoagulable state     Prothrombin Gene Mutation-2017; no h/o DVTs/PEs; strong family h/o DVTs; w/u resulted in MTFHR and prothrombin gene mutation    Restless leg     Rheumatoid arthritis     Rheumatoid arthritis flare    Sicca syndrome     Sjogren's syndrome-2017    Sleep apnea, unspecified        Past Surgical History:   Procedure Laterality Date    BUNIONECTOMY      ELBOW SURGERY      KNEE SURGERY      SINUS SURGERY      WISDOM TOOTH EXTRACTION Bilateral        Social History[1]    Family History   Problem Relation Name Age of Onset    Breast cancer Mother dx @ 67        Patient's Medications   New Prescriptions    IRON-VITAMIN C 100-250 MG, ICAR-C, 100-250 MG TAB    Take 1 tablet by mouth once daily.    PROPRANOLOL (INDERAL) 20 MG TABLET    Take 1 tablet (20 mg total) by mouth 3 (three) times daily as needed (palpitations, fluttering - with HR > 120).   Previous Medications    ALPRAZOLAM (XANAX) 0.5 MG TABLET    Take 0.5 mg by mouth daily as needed for Anxiety.    AZELASTINE (ASTELIN) 137 MCG (0.1 %) NASAL SPRAY    1 spray (137 mcg total) by Nasal route 2 (two) times daily.    BUSPIRONE (BUSPAR) 15 MG  TABLET    Take 15 mg by mouth 2 (two) times daily.    CETIRIZINE (ZYRTEC) 10 MG TABLET    Take 10 mg by mouth once daily.    ENOXAPARIN (LOVENOX) 30 MG/0.3 ML SYRG    SMARTSI.3 Milliliter(s) SUB-Q Every 12 Hours    EPINEPHRINE (EPIPEN) 0.3 MG/0.3 ML ATIN    Inject as directed.    FLUTICASONE-UMECLIDIN-VILANTER (TRELEGY ELLIPTA) 200-62.5-25 MCG INHALER    Inhale 1 puff into the lungs once daily.    GABAPENTIN (NEURONTIN) 100 MG CAPSULE    Take 200 mg by mouth.    HYDROXYCHLOROQUINE (PLAQUENIL) 200 MG TABLET    Take 200 mg by mouth once daily.    HYOSCYAMINE SULFATE (HYOSYNE ORAL)    Take by mouth.    KETOROLAC (TORADOL) 10 MG TABLET    Take 10 mg by mouth every 8 (eight) hours as needed.    MECLIZINE (ANTIVERT) 25 MG TABLET    Take 1 tablet (25 mg total) by mouth 3 (three) times daily as needed.    MELOXICAM (MOBIC) 15 MG TABLET    Take 15 mg by mouth once daily.    ONDANSETRON (ZOFRAN-ODT) 8 MG TBDL    Take by mouth.    PREDNISONE (DELTASONE) 10 MG TABLET    Take 10 mg by mouth daily as needed.    PROMETHAZINE (PHENERGAN) 25 MG TABLET    Take 25 mg by mouth.    VALACYCLOVIR (VALTREX) 500 MG TABLET    Take 500 mg by mouth.    VITAMIN D (VITAMIN D3) 1000 UNITS TAB    Take 1,000 Units by mouth once daily.    VORTIOXETINE (TRINTELLIX) 20 MG TAB    Take 20 mg by mouth.   Modified Medications    No medications on file   Discontinued Medications    No medications on file       Review of Systems   Constitutional:  Positive for malaise/fatigue.   HENT: Negative.     Eyes: Negative.    Respiratory:  Positive for shortness of breath.    Cardiovascular:  Positive for palpitations.   Gastrointestinal: Negative.    Genitourinary: Negative.    Musculoskeletal: Negative.    Skin: Negative.    Neurological:  Positive for dizziness.   Endo/Heme/Allergies: Negative.    Psychiatric/Behavioral: Negative.     All 12 systems otherwise negative.      Wt Readings from Last 3 Encounters:   25 115.4 kg (254 lb 6.6 oz)   25  "115.4 kg (254 lb 6.6 oz)   04/10/25 117.1 kg (258 lb 2.5 oz)     Temp Readings from Last 3 Encounters:   01/09/25 97.7 °F (36.5 °C) (Temporal)   11/14/24 97.5 °F (36.4 °C) (Temporal)   10/03/24 97.3 °F (36.3 °C) (Temporal)     BP Readings from Last 3 Encounters:   05/02/25 124/88   04/10/25 (!) 154/63   04/01/25 122/70     Pulse Readings from Last 3 Encounters:   05/02/25 89   04/10/25 93   04/01/25 96       /88 (BP Location: Right arm, Patient Position: Sitting)   Pulse 89   Ht 5' 7" (1.702 m)   Wt 115.4 kg (254 lb 6.6 oz)   SpO2 96%   BMI 39.85 kg/m²     Objective:   Physical Exam  Vitals and nursing note reviewed.   Constitutional:       General: She is not in acute distress.     Appearance: She is well-developed. She is obese. She is not diaphoretic.   HENT:      Head: Normocephalic and atraumatic.      Nose: Nose normal.   Eyes:      General: No scleral icterus.     Conjunctiva/sclera: Conjunctivae normal.   Neck:      Thyroid: No thyromegaly.      Vascular: No JVD.   Cardiovascular:      Rate and Rhythm: Normal rate and regular rhythm.      Heart sounds: S1 normal and S2 normal. Murmur heard.      No friction rub. No gallop. No S3 or S4 sounds.   Pulmonary:      Effort: Pulmonary effort is normal. No respiratory distress.      Breath sounds: Normal breath sounds. No stridor. No wheezing or rales.   Chest:      Chest wall: No tenderness.   Abdominal:      General: Bowel sounds are normal. There is no distension.      Palpations: Abdomen is soft. There is no mass.      Tenderness: There is no abdominal tenderness. There is no rebound.   Genitourinary:     Comments: Deferred  Musculoskeletal:         General: No tenderness or deformity. Normal range of motion.      Cervical back: Normal range of motion and neck supple.   Lymphadenopathy:      Cervical: No cervical adenopathy.   Skin:     General: Skin is warm and dry.      Coloration: Skin is not pale.      Findings: No erythema or rash. "   Neurological:      Mental Status: She is alert and oriented to person, place, and time.      Motor: No abnormal muscle tone.      Coordination: Coordination normal.   Psychiatric:         Behavior: Behavior normal.         Thought Content: Thought content normal.         Judgment: Judgment normal.         Lab Results   Component Value Date     04/17/2023    K 3.9 04/17/2023     04/17/2023    CO2 22 (L) 04/17/2023    BUN 18 04/17/2023    CREATININE 0.9 04/17/2023     04/17/2023    AST 19 04/17/2023    ALT 18 04/17/2023    ALBUMIN 4.3 04/17/2023    PROT 7.5 04/17/2023    BILITOT 0.3 04/17/2023    WBC 4.87 08/21/2024    HGB 12.8 08/21/2024    HCT 38.3 08/21/2024    MCV 90 08/21/2024     08/21/2024    TSH 3.900 01/25/2022    BNP 12 04/17/2023         BNP   Date Value   04/17/2023 12 pg/mL   03/10/2023 11 PG/ML (L)          Assessment:      1. Tachycardia    2. Shortness of breath    3. Dysautonomia    4. Exercise-induced asthma    5. Moderate persistent asthma without complication    6. Severe obesity (BMI 35.0-39.9) with comorbidity    7. Sjogren's syndrome, with unspecified organ involvement    8. B12 deficiency    9. Iron deficiency anemia, unspecified iron deficiency anemia type    10. Vitamin D deficiency        Plan:     SOB, dysautonomia  - had prior neg workup   -order 7 day vital monitor  - order stress ECHO   - start PRN propranolol     2. Severe Obesity  - cont weight loss    3. Asthma  - cont tx per PCP/pulm    4.RA? Seroneg , Sjogrens  - f/u rheum     5. ITALO  - cannot tolerate CPAP  - had sinus surg    6. FE def  - start iron tabs   - refer to heme/onc  - check labs - Vit d, b12     Visit today included increased complexity associated with the care of the episodic problem dyspnea addressed and managing the longitudinal care of the patient due to the serious and/or complex managed problem(s) .    Thank you for allowing me to participate in this patient's care. Please do not  hesitate to contact me with any questions or concerns. Consult note has been forwarded to the referral physician.          [1]   Social History  Tobacco Use    Smoking status: Never    Smokeless tobacco: Never

## 2025-05-05 ENCOUNTER — PATIENT MESSAGE (OUTPATIENT)
Dept: CARDIOLOGY | Facility: CLINIC | Age: 35
End: 2025-05-05
Payer: COMMERCIAL

## 2025-05-05 ENCOUNTER — RESULTS FOLLOW-UP (OUTPATIENT)
Dept: CARDIOLOGY | Facility: CLINIC | Age: 35
End: 2025-05-05

## 2025-05-05 LAB
OHS QRS DURATION: 96 MS
OHS QTC CALCULATION: 408 MS

## 2025-05-13 ENCOUNTER — TELEPHONE (OUTPATIENT)
Dept: HEMATOLOGY/ONCOLOGY | Facility: CLINIC | Age: 35
End: 2025-05-13
Payer: COMMERCIAL

## 2025-05-13 NOTE — TELEPHONE ENCOUNTER
----- Message from Jennifer sent at 5/13/2025 12:17 PM CDT -----  Regarding: FW: Scheduling Request  Contact: Dhara Amezcua  Apolinar Figueroa patient has a referral placed for  Iron deficiency anemia, unspecified iron deficiency anemia type [D50.9] she would prefer to be scheduled at the [facility], [facility] or [facility] due to convenience Can you please assist  Thank you Zohra [facility]- [phone]-  [phone]  ----- Message -----  From: Jj Castillo RN  Sent: 5/12/2025   4:55 PM CDT  To: [facility]  Subject: FW: Scheduling Request                             ----- Message -----  From: Haily Handy  Sent: 5/12/2025   4:45 PM CDT  To: [facility]  Subject: Scheduling Request                               Scheduling Request   Appt Type:  NP  D50.9 (ICD-10-CM) - Iron deficiency anemia, unspecified iron deficiency anemia type Date/Time Preference:any Treating Provider: Caller Name:Dhara Amezcua  Contact Preference:[phone]  Comments/notes:Patient is calling to schedule from referral with

## 2025-05-14 ENCOUNTER — HOSPITAL ENCOUNTER (OUTPATIENT)
Dept: CARDIOLOGY | Facility: HOSPITAL | Age: 35
Discharge: HOME | End: 2025-05-14
Attending: INTERNAL MEDICINE
Payer: COMMERCIAL

## 2025-05-14 VITALS
DIASTOLIC BLOOD PRESSURE: 67 MMHG | BODY MASS INDEX: 39.87 KG/M2 | WEIGHT: 254 LBS | SYSTOLIC BLOOD PRESSURE: 136 MMHG | HEIGHT: 67 IN

## 2025-05-14 DIAGNOSIS — G90.1 DYSAUTONOMIA: ICD-10-CM

## 2025-05-14 DIAGNOSIS — J45.990 EXERCISE-INDUCED ASTHMA: ICD-10-CM

## 2025-05-14 DIAGNOSIS — E66.01 SEVERE OBESITY (BMI 35.0-39.9) WITH COMORBIDITY: ICD-10-CM

## 2025-05-14 DIAGNOSIS — M35.00 SJOGREN'S SYNDROME, WITH UNSPECIFIED ORGAN INVOLVEMENT: ICD-10-CM

## 2025-05-14 DIAGNOSIS — R06.02 SHORTNESS OF BREATH: ICD-10-CM

## 2025-05-14 DIAGNOSIS — E53.8 B12 DEFICIENCY: ICD-10-CM

## 2025-05-14 DIAGNOSIS — R00.0 TACHYCARDIA: ICD-10-CM

## 2025-05-14 DIAGNOSIS — D50.9 IRON DEFICIENCY ANEMIA, UNSPECIFIED IRON DEFICIENCY ANEMIA TYPE: ICD-10-CM

## 2025-05-14 LAB
AORTIC ROOT ANNULUS: 2.7 CM
AORTIC SIZE INDEX (SOV): 1.1 CM/M2
AV INDEX (PROSTH): 0.75
AV MEAN GRADIENT: 4 MMHG
AV PEAK GRADIENT: 7 MMHG
AV VALVE AREA BY VELOCITY RATIO: 2.4 CM²
AV VALVE AREA: 2.4 CM²
AV VELOCITY RATIO: 0.77
BSA FOR ECHO PROCEDURE: 2.33 M2
CV ECHO LV RWT: 0.45 CM
CV STRESS BASE HR: 104 BPM
DIASTOLIC BLOOD PRESSURE: 67 MMHG
DOP CALC AO PEAK VEL: 1.3 M/S
DOP CALC AO VTI: 24.7 CM
DOP CALC LVOT AREA: 3.1 CM2
DOP CALC LVOT DIAMETER: 2 CM
DOP CALC LVOT PEAK VEL: 1 M/S
DOP CALC LVOT STROKE VOLUME: 58.4 CM3
DOP CALCLVOT PEAK VEL VTI: 18.6 CM
E WAVE DECELERATION TIME: 223 MSEC
E/A RATIO: 1.12
E/E' RATIO: 6 M/S
ECHO LV POSTERIOR WALL: 0.9 CM (ref 0.6–1.1)
EJECTION FRACTION: 60 %
FRACTIONAL SHORTENING: 30 % (ref 28–44)
INTERVENTRICULAR SEPTUM: 1.1 CM (ref 0.6–1.1)
IVRT: 86 MSEC
LA MAJOR: 4.1 CM
LA MINOR: 3.8 CM
LA WIDTH: 2.7 CM
LEFT ATRIUM AREA SYSTOLIC (APICAL 2 CHAMBER): 13.07 CM2
LEFT ATRIUM AREA SYSTOLIC (APICAL 4 CHAMBER): 13.71 CM2
LEFT ATRIUM SIZE: 3.1 CM
LEFT ATRIUM VOLUME INDEX MOD: 15 ML/M2
LEFT ATRIUM VOLUME INDEX: 13 ML/M2
LEFT ATRIUM VOLUME MOD: 34 ML
LEFT ATRIUM VOLUME: 28 CM3
LEFT INTERNAL DIMENSION IN SYSTOLE: 2.8 CM (ref 2.1–4)
LEFT VENTRICLE DIASTOLIC VOLUME INDEX: 31.25 ML/M2
LEFT VENTRICLE DIASTOLIC VOLUME: 70 ML
LEFT VENTRICLE END SYSTOLIC VOLUME APICAL 2 CHAMBER: 32.84 ML
LEFT VENTRICLE END SYSTOLIC VOLUME APICAL 4 CHAMBER: 28.77 ML
LEFT VENTRICLE MASS INDEX: 56.7 G/M2
LEFT VENTRICLE SYSTOLIC VOLUME INDEX: 12.9 ML/M2
LEFT VENTRICLE SYSTOLIC VOLUME: 29 ML
LEFT VENTRICULAR INTERNAL DIMENSION IN DIASTOLE: 4 CM (ref 3.5–6)
LEFT VENTRICULAR MASS: 127.1 G
LV LATERAL E/E' RATIO: 5.6 M/S
LV SEPTAL E/E' RATIO: 7.6 M/S
LVED V (TEICH): 70.17 ML
LVES V (TEICH): 29.19 ML
LVOT MG: 2.46 MMHG
LVOT MV: 0.75 CM/S
MV PEAK A VEL: 0.75 M/S
MV PEAK E VEL: 0.84 M/S
OHS CV CPX 1 MINUTE RECOVERY HEART RATE: 128 BPM
OHS CV CPX 85 PERCENT MAX PREDICTED HEART RATE MALE: 156
OHS CV CPX ESTIMATED METS: 9
OHS CV CPX MAX PREDICTED HEART RATE: 184
OHS CV CPX PATIENT IS FEMALE: 1
OHS CV CPX PATIENT IS MALE: 0
OHS CV CPX PEAK DIASTOLIC BLOOD PRESSURE: 65 MMHG
OHS CV CPX PEAK HEAR RATE: 157 BPM
OHS CV CPX PEAK RATE PRESSURE PRODUCT: NORMAL
OHS CV CPX PEAK SYSTOLIC BLOOD PRESSURE: 194 MMHG
OHS CV CPX PERCENT MAX PREDICTED HEART RATE ACHIEVED: 90
OHS CV CPX RATE PRESSURE PRODUCT PRESENTING: NORMAL
OHS CV RV/LV RATIO: 0.53 CM
PISA TR MAX VEL: 2.1 M/S
PV MV: 0.76 M/S
PV PEAK GRADIENT: 4 MMHG
PV PEAK VELOCITY: 0.99 M/S
RA MAJOR: 4.33 CM
RA PRESSURE ESTIMATED: 3 MMHG
RA WIDTH: 2.9 CM
RIGHT VENTRICLE DIASTOLIC BASEL DIMENSION: 2.1 CM
RIGHT VENTRICULAR END-DIASTOLIC DIMENSION: 2.1 CM
RV TB RVSP: 5 MMHG
SINUS: 2.52 CM
STJ: 2.5 CM
STRESS ECHO POST EXERCISE DUR MIN: 7 MINUTES
STRESS ECHO POST EXERCISE DUR SEC: 4 SECONDS
SYSTOLIC BLOOD PRESSURE: 136 MMHG
TDI LATERAL: 0.15 M/S
TDI SEPTAL: 0.11 M/S
TDI: 0.13 M/S
TR MAX PG: 17 MMHG
TRICUSPID ANNULAR PLANE SYSTOLIC EXCURSION: 2.3 CM
TV REST PULMONARY ARTERY PRESSURE: 21 MMHG
Z-SCORE OF LEFT VENTRICULAR DIMENSION IN END DIASTOLE: -6.95
Z-SCORE OF LEFT VENTRICULAR DIMENSION IN END SYSTOLE: -4.35

## 2025-05-14 PROCEDURE — 93351 STRESS TTE COMPLETE: CPT

## 2025-05-14 PROCEDURE — 93352 ADMIN ECG CONTRAST AGENT: CPT | Mod: ,,, | Performed by: INTERNAL MEDICINE

## 2025-05-14 PROCEDURE — 93351 STRESS TTE COMPLETE: CPT | Mod: 26,,, | Performed by: INTERNAL MEDICINE

## 2025-05-14 PROCEDURE — 25500020 PHARM REV CODE 255: Performed by: INTERNAL MEDICINE

## 2025-05-14 RX ADMIN — HUMAN ALBUMIN MICROSPHERES AND PERFLUTREN 0.11 MG: 10; .22 INJECTION, SOLUTION INTRAVENOUS at 02:05

## 2025-05-14 NOTE — NURSING NOTE
Pt presented for an echocardiogram today.  This study was performed in conjunction with Optison contrast agent because of poor endocardial visualization.  Procedure was explained to the patient, she verbalized understanding and signed the consent.  IV, 22ga x 1 attempts, was started in the Rt. AC using aseptic technique.  IV discontinued. Pressure dressing applied.

## 2025-05-15 ENCOUNTER — TELEPHONE (OUTPATIENT)
Dept: HEMATOLOGY/ONCOLOGY | Facility: CLINIC | Age: 35
End: 2025-05-15
Payer: COMMERCIAL

## 2025-05-15 NOTE — TELEPHONE ENCOUNTER
Lvm for a return call in reference to Hem/Onc appt scheduled on 6/27 has been canceled and will need to be r/s d/t being scheduled incorrectly on Onc schedule. Tasneemner message sent.

## 2025-06-11 ENCOUNTER — TELEPHONE (OUTPATIENT)
Dept: HEMATOLOGY/ONCOLOGY | Facility: CLINIC | Age: 35
End: 2025-06-11
Payer: COMMERCIAL

## 2025-06-11 DIAGNOSIS — D50.9 IDA (IRON DEFICIENCY ANEMIA): Primary | ICD-10-CM

## 2025-06-11 NOTE — TELEPHONE ENCOUNTER
Pt returned call hematology appt scheduled per request first with Dr. Estrada. Notice via pt portal.

## 2025-06-11 NOTE — TELEPHONE ENCOUNTER
Copied from CRM #[id number]. Topic: Appointments - Appointment Access  >> Jun 11, 2025  9:13 AM Evelia wrote:  Pt is calling to speak with the nurse regarding appt today. Reports wasn't aware of appt being cancelled today and would like to speak with someone. Please give pt a call back at .[phone]

## 2025-06-12 ENCOUNTER — LAB VISIT (OUTPATIENT)
Dept: LAB | Facility: HOSPITAL | Age: 35
End: 2025-06-12
Attending: STUDENT IN AN ORGANIZED HEALTH CARE EDUCATION/TRAINING PROGRAM
Payer: COMMERCIAL

## 2025-06-12 ENCOUNTER — OFFICE VISIT (OUTPATIENT)
Dept: ALLERGY | Facility: CLINIC | Age: 35
End: 2025-06-12
Payer: COMMERCIAL

## 2025-06-12 VITALS
TEMPERATURE: 98 F | SYSTOLIC BLOOD PRESSURE: 126 MMHG | HEIGHT: 67 IN | WEIGHT: 257.5 LBS | HEART RATE: 95 BPM | DIASTOLIC BLOOD PRESSURE: 80 MMHG | OXYGEN SATURATION: 98 % | BODY MASS INDEX: 40.42 KG/M2

## 2025-06-12 DIAGNOSIS — B99.9 RECURRENT INFECTIONS: Primary | ICD-10-CM

## 2025-06-12 DIAGNOSIS — B99.9 RECURRENT INFECTIONS: ICD-10-CM

## 2025-06-12 PROBLEM — D84.89: Status: RESOLVED | Noted: 2024-10-03 | Resolved: 2025-06-12

## 2025-06-12 PROCEDURE — 3079F DIAST BP 80-89 MM HG: CPT | Mod: CPTII,S$GLB,, | Performed by: STUDENT IN AN ORGANIZED HEALTH CARE EDUCATION/TRAINING PROGRAM

## 2025-06-12 PROCEDURE — 1159F MED LIST DOCD IN RCRD: CPT | Mod: CPTII,S$GLB,, | Performed by: STUDENT IN AN ORGANIZED HEALTH CARE EDUCATION/TRAINING PROGRAM

## 2025-06-12 PROCEDURE — 99999 PR PBB SHADOW E&M-EST. PATIENT-LVL III: CPT | Mod: PBBFAC,,, | Performed by: STUDENT IN AN ORGANIZED HEALTH CARE EDUCATION/TRAINING PROGRAM

## 2025-06-12 PROCEDURE — 3074F SYST BP LT 130 MM HG: CPT | Mod: CPTII,S$GLB,, | Performed by: STUDENT IN AN ORGANIZED HEALTH CARE EDUCATION/TRAINING PROGRAM

## 2025-06-12 PROCEDURE — 86581 STRPTCS PNEUM ANTB SEROT IA: CPT

## 2025-06-12 PROCEDURE — 1160F RVW MEDS BY RX/DR IN RCRD: CPT | Mod: CPTII,S$GLB,, | Performed by: STUDENT IN AN ORGANIZED HEALTH CARE EDUCATION/TRAINING PROGRAM

## 2025-06-12 PROCEDURE — 36415 COLL VENOUS BLD VENIPUNCTURE: CPT

## 2025-06-12 PROCEDURE — 3008F BODY MASS INDEX DOCD: CPT | Mod: CPTII,S$GLB,, | Performed by: STUDENT IN AN ORGANIZED HEALTH CARE EDUCATION/TRAINING PROGRAM

## 2025-06-12 PROCEDURE — 99215 OFFICE O/P EST HI 40 MIN: CPT | Mod: S$GLB,,, | Performed by: STUDENT IN AN ORGANIZED HEALTH CARE EDUCATION/TRAINING PROGRAM

## 2025-06-12 RX ORDER — CLOBETASOL PROPIONATE 0.5 MG/G
OINTMENT TOPICAL 2 TIMES DAILY
Qty: 60 G | Refills: 11 | Status: SHIPPED | OUTPATIENT
Start: 2025-06-12 | End: 2026-06-12

## 2025-06-12 NOTE — PROGRESS NOTES
Allergy and Immunology  Established Patient Clinic Note    Date: 6/12/2025  Chief Complaint   Patient presents with    PPSV23     History  Dhara Amezcua is a 34 y.o. female being seen for follow-up today.    Recurrent Infections   NK Cell Deficiency (Resolved)  No infections or steroids since prior appointment   NK cells recovered - no underlying genetic concern   Pitting pneumonia 23 titers at this time   Patient is not on immunosuppressant   Patient does have HS and may be started on Humira versus Skyrizi   There is some concern for seronegative arthropathies  Follow with Rheumatology     Initial HPI:   - Prior hx of sinus sx and FESS   - Multiple sinus infections per year   - Started more in adulthood - especially in college     Chronic Nonallergic Rhinitis   - No acute complaints      Moderate Persistent Asthma?  Shortness of Breath   Exercise Induced Asthma\  Dysautonomia/POTS?   Shortness of breath may be due to significant iron-deficiency anemia   Patient is being started on oral supplementation with vitamin-C   Patient to meet with Hematology   Patient may be a candidate for iron infusions  Menorrhalgia may be contributing though GI absorption is an issue of concern     Drug Allergy  - See allergy tab for positives  - To be addressed more at next appointment     Allergies, PMH, PSH, Social, and Family History were reviewed.    Medications Ordered Prior to Encounter[1]    Physical Examination  Vitals:    06/12/25 0724   BP: 126/80   Pulse: 95   Temp: 97.9 °F (36.6 °C)     GENERAL:  female in no apparent distress and well developed and well nourished  HEAD:  Normocephalic, without obvious abnormality, atraumatic  EYES: sclera anicteric, conjunctiva normochromic  EARS: normal TM's and external ear canals both ears  NOSE: without erythema or discharge, clear discharge, turbinates normal    OROPHARYNX: moist mucous membranes without erythema, exudates or petechiae  LYMPH NODES: normal, supple, no  lymphadenopathy  LUNGS: clear to auscultation, no wheezes, rales or rhonchi, symmetric air entry.  HEART: normal rate, regular rhythm, normal S1, S2, no murmurs, rubs, clicks or gallops.  ABDOMEN: soft, nontender, nondistended, no masses or organomegaly.  MUSCULOSKELETAL: no gross joint deformity or swelling.  NEURO: alert, oriented, normal speech, no focal findings or movement disorder noted.  SKIN: normal coloration and turgor, no rashes, no suspicious skin lesions noted.     Assessment/Plan:   Problem List Items Addressed This Visit       Recurrent infections - Primary    Overview   - 08/21/2024: Administered PPSV23     - Prior hx of sinus sx and FESS   - Multiple sinus infections per year   - Started more in adulthood - especially in college            Relevant Orders    Streptococcus Pneumoniae IgG Antibody (23 Serotypes), MAID     Recurrent infections   Prior in K deficiency   Resolution of indicated deficiency after cessation of azathioprine   Patient may be started on Adalimumab versus Skyrizi for hidradenitis suppurativa  Additionally patient has some concern of seronegative arthropathies such as psoriatic arthritis  We will try to do as many immune boosters meaning vaccinations prior to initiation of immunosuppressants   Patient with iron-deficiency anemia   Recommend iron infusions if indicated if not able to absorb   If not absorbing oral iron may additionally want to do evaluation for celiac disease   Iron-deficiency anemia can be associated with celiac disease   Patient has a history of autoimmune and immune dysregulation    Follow up:  Follow up in about 2 months (around 8/12/2025).    40+ minutes spent on direct and indirect health care of this patient.  Patient is a complex immune patient with additional timing to discuss the mechanisms and future planning.    DISCLAIMER: This note was prepared with [employer] voice recognition transcription software. Garbled syntax, mangled pronouns, and other bizarre  constructions may be attributed to that software system. While efforts were made to correct any mistakes made by this voice recognition program, some errors and/or omissions may remain in the note that were missed when the note was originally created.     Ezequiel Welch MD   [facility]  Allergy and Immunology       [1]   Current Outpatient Medications on File Prior to Visit   Medication Sig Dispense Refill    ALPRAZolam (XANAX) 0.5 MG tablet Take 0.5 mg by mouth daily as needed for Anxiety.      azelastine (ASTELIN) 137 mcg (0.1 %) nasal spray 1 spray (137 mcg total) by Nasal route 2 (two) times daily. 30 mL 11    busPIRone (BUSPAR) 15 MG tablet Take 15 mg by mouth 2 (two) times daily.      cetirizine (ZYRTEC) 10 MG tablet Take 10 mg by mouth once daily.      enoxaparin (LOVENOX) 30 mg/0.3 mL Syrg SMARTSI.3 Milliliter(s) SUB-Q Every 12 Hours      EPINEPHrine (EPIPEN) 0.3 mg/0.3 mL AtIn Inject as directed.      fluticasone-umeclidin-vilanter (TRELEGY ELLIPTA) 200-62.5-25 mcg inhaler Inhale 1 puff into the lungs once daily. 60 each 11    gabapentin (NEURONTIN) 100 MG capsule Take 200 mg by mouth.      hydroxychloroquine (PLAQUENIL) 200 mg tablet Take 200 mg by mouth once daily.      hyoscyamine sulfate (HYOSYNE ORAL) Take by mouth.      iron-vitamin C 100-250 mg, ICAR-C, 100-250 mg Tab Take 1 tablet by mouth once daily. 90 tablet 1    ketorolac (TORADOL) 10 mg tablet Take 10 mg by mouth every 8 (eight) hours as needed.      meclizine (ANTIVERT) 25 mg tablet Take 1 tablet (25 mg total) by mouth 3 (three) times daily as needed. 25 tablet 0    meloxicam (MOBIC) 15 MG tablet Take 15 mg by mouth once daily.      ondansetron (ZOFRAN-ODT) 8 MG TbDL Take by mouth.      predniSONE (DELTASONE) 10 MG tablet Take 10 mg by mouth daily as needed.      promethazine (PHENERGAN) 25 MG tablet Take 25 mg by mouth.      propranoloL (INDERAL) 20 MG tablet Take 1 tablet (20 mg total) by mouth 3 (three) times daily as needed  (palpitations, fluttering - with HR > 120). 90 tablet 3    valACYclovir (VALTREX) 500 MG tablet Take 500 mg by mouth.      vitamin D (VITAMIN D3) 1000 units Tab Take 1,000 Units by mouth once daily.      vortioxetine (TRINTELLIX) 20 mg Tab Take 20 mg by mouth.       No current facility-administered medications on file prior to visit.

## 2025-06-17 ENCOUNTER — RESULTS FOLLOW-UP (OUTPATIENT)
Dept: ALLERGY | Facility: CLINIC | Age: 35
End: 2025-06-17

## 2025-06-17 LAB
IMMUNOLOGIST REVIEW: NORMAL
S PN DA SERO 19F IGG SER-MCNC: 0.4 MCG/ML
S PNEUM DA 1 IGG SER-MCNC: 0.7 MCG/ML
S PNEUM DA 10A IGG SER-MCNC: 1.5 MCG/ML
S PNEUM DA 11A IGG SER-MCNC: 0.5 MCG/ML
S PNEUM DA 12F IGG SER-MCNC: 0.4 MCG/ML
S PNEUM DA 14 IGG SER-MCNC: 0.4 MCG/ML
S PNEUM DA 15B IGG SER-MCNC: 0.6 MCG/ML
S PNEUM DA 17F IGG SER-MCNC: 1 MCG/ML
S PNEUM DA 18C IGG SER-MCNC: 0.1 MCG/ML
S PNEUM DA 19A IGG SER-MCNC: 0.5 MCG/ML
S PNEUM DA 2 IGG SER-MCNC: 7.7 MCG/ML
S PNEUM DA 20A IGG SER-MCNC: 1.3 MCG/ML
S PNEUM DA 22F IGG SER-MCNC: 3 MCG/ML
S PNEUM DA 23F IGG SER-MCNC: 0.2 MCG/ML
S PNEUM DA 3 IGG SER-MCNC: 0.1 MCG/ML
S PNEUM DA 33F IGG SER-MCNC: 1.2 MCG/ML
S PNEUM DA 4 IGG SER-MCNC: 6.7 MCG/ML
S PNEUM DA 5 IGG SER-MCNC: <0.1 MCG/ML
S PNEUM DA 6B IGG SER-MCNC: 0.2 MCG/ML
S PNEUM DA 7F IGG SER-MCNC: 0.2 MCG/ML
S PNEUM DA 8 IGG SER-MCNC: 2 MCG/ML
S PNEUM DA 9N IGG SER-MCNC: 2.9 MCG/ML
S PNEUM DA 9V IGG SER-MCNC: 0.8 MCG/ML

## 2025-07-01 ENCOUNTER — LAB VISIT (OUTPATIENT)
Dept: LAB | Facility: HOSPITAL | Age: 35
End: 2025-07-01
Attending: INTERNAL MEDICINE
Payer: COMMERCIAL

## 2025-07-01 DIAGNOSIS — D50.9 IDA (IRON DEFICIENCY ANEMIA): ICD-10-CM

## 2025-07-01 LAB
ABSOLUTE EOSINOPHIL (OHS): 0.12 K/UL
ABSOLUTE MONOCYTE (OHS): 0.33 K/UL (ref 0.3–1)
ABSOLUTE NEUTROPHIL COUNT (OHS): 3.85 K/UL (ref 1.8–7.7)
BASOPHILS # BLD AUTO: 0.03 K/UL
BASOPHILS NFR BLD AUTO: 0.5 %
ERYTHROCYTE [DISTWIDTH] IN BLOOD BY AUTOMATED COUNT: 12.5 % (ref 11.5–14.5)
FERRITIN SERPL-MCNC: 17 NG/ML (ref 20–300)
HCT VFR BLD AUTO: 41.4 % (ref 37–48.5)
HGB BLD-MCNC: 13.9 GM/DL (ref 12–16)
IMM GRANULOCYTES # BLD AUTO: 0.01 K/UL (ref 0–0.04)
IMM GRANULOCYTES NFR BLD AUTO: 0.2 % (ref 0–0.5)
IRON SATN MFR SERPL: 13 % (ref 20–50)
IRON SERPL-MCNC: 53 UG/DL (ref 30–160)
LYMPHOCYTES # BLD AUTO: 1.49 K/UL (ref 1–4.8)
MCH RBC QN AUTO: 28.4 PG (ref 27–31)
MCHC RBC AUTO-ENTMCNC: 33.6 G/DL (ref 32–36)
MCV RBC AUTO: 85 FL (ref 82–98)
NUCLEATED RBC (/100WBC) (OHS): 0 /100 WBC
PLATELET # BLD AUTO: 255 K/UL (ref 150–450)
PMV BLD AUTO: 9.4 FL (ref 9.2–12.9)
RBC # BLD AUTO: 4.9 M/UL (ref 4–5.4)
RELATIVE EOSINOPHIL (OHS): 2.1 %
RELATIVE LYMPHOCYTE (OHS): 25.6 % (ref 18–48)
RELATIVE MONOCYTE (OHS): 5.7 % (ref 4–15)
RELATIVE NEUTROPHIL (OHS): 65.9 % (ref 38–73)
TIBC SERPL-MCNC: 420 UG/DL (ref 250–450)
TRANSFERRIN SERPL-MCNC: 284 MG/DL (ref 200–375)
WBC # BLD AUTO: 5.83 K/UL (ref 3.9–12.7)

## 2025-07-01 PROCEDURE — 84466 ASSAY OF TRANSFERRIN: CPT

## 2025-07-01 PROCEDURE — 85025 COMPLETE CBC W/AUTO DIFF WBC: CPT

## 2025-07-01 PROCEDURE — 36415 COLL VENOUS BLD VENIPUNCTURE: CPT

## 2025-07-01 PROCEDURE — 82728 ASSAY OF FERRITIN: CPT

## 2025-07-08 ENCOUNTER — OFFICE VISIT (OUTPATIENT)
Dept: HEMATOLOGY/ONCOLOGY | Facility: CLINIC | Age: 35
End: 2025-07-08
Payer: COMMERCIAL

## 2025-07-08 VITALS
DIASTOLIC BLOOD PRESSURE: 69 MMHG | OXYGEN SATURATION: 98 % | SYSTOLIC BLOOD PRESSURE: 110 MMHG | HEART RATE: 83 BPM | RESPIRATION RATE: 20 BRPM | WEIGHT: 255.94 LBS | HEIGHT: 67 IN | TEMPERATURE: 98 F | BODY MASS INDEX: 40.17 KG/M2

## 2025-07-08 DIAGNOSIS — D50.0 IRON DEFICIENCY ANEMIA DUE TO CHRONIC BLOOD LOSS: Primary | ICD-10-CM

## 2025-07-08 DIAGNOSIS — R53.83 FATIGUE, UNSPECIFIED TYPE: ICD-10-CM

## 2025-07-08 DIAGNOSIS — E28.2 PCOS (POLYCYSTIC OVARIAN SYNDROME): ICD-10-CM

## 2025-07-08 DIAGNOSIS — G90.1 DYSAUTONOMIA: ICD-10-CM

## 2025-07-08 PROCEDURE — 99999 PR PBB SHADOW E&M-EST. PATIENT-LVL V: CPT | Mod: PBBFAC,,, | Performed by: INTERNAL MEDICINE

## 2025-07-08 NOTE — PROGRESS NOTES
[facility] [place]  Phone: [phone];  Fax: [phone]    Patient ID: Dhara Amezcua   Reason for Visit: Establish Care  MRN:  [id number]     Subjective   Dhara Amezcua is a 34 y.o. female who presents to clinic to establish care on referral for anemia.  Her medical history includes Sjogren's Syndrome,  Asthma, PCOS, Hx of Nephrolithiasis, Dysautonomia, Hypothyroidism, POTS, Hx RA, Sjogren's Syndrome, Sicca Syndrome, ITALO, Michael Danlos Syndrome, IBS, POTS, RLS, anxiety, and non-restorative sleep.    She has felt terrible since 2008 when she feels that she had mono and hasn't quite felt the same since.  She was diagnosed with Sjrogren's in 2017.  She has had her sleep apnea controlled made diet changes but none of the changes that she has made or medical workup that she has had has made a difference.  She states that she has seen neurology, cardiology and pulmonology for ataxia, fatigue, and SOB respectively.      In November, she was tested for iron deficiency and was found to be deficient.  She has been on 50 mg of PO iron for approximately 3 months and had a 20% improvement in her symptoms but still feels poorly.     She endorses menorrhagia (uses 1 super tampon per hour for 2 days; lasts 5 days; periods are regular).  She has some spotting in between periods.  She endorses blood in her stool in 2 years ago but not recently; she sees blood in her urine sometimes but isn't sure if it is from vaginal bleeding or truly urine.  She denies nose bleeds.       Socially, she rarely drinks alcohol, denies smoking and illicit drug use.  Dietary wise, she is on an antiinflammatory diet which does include red meat, fresh fruits and vegetables.  Family history is significant for malignancy, and thyroid issues but not any blood or bleeding disorders.      Review of Systems   Constitutional:  Positive for fatigue. Negative for appetite change.    Respiratory:  Positive for shortness of breath.    Cardiovascular:  Negative for chest pain.   Gastrointestinal:  Negative for anal bleeding, blood in stool, constipation, diarrhea, nausea, rectal pain and vomiting.   Genitourinary:  Negative for difficulty urinating, hematuria, menstrual problem and vaginal bleeding.   Musculoskeletal:  Negative for arthralgias.   Skin:  Negative for rash.   Neurological:  Negative for light-headedness and headaches.   Hematological:  Negative for adenopathy.     History     Oncology History    No history exists.         Past Medical History:   Diagnosis Date    Abnormality of right breast on screening mammogram 02/09/2024    Amenorrhea 2008    PCOS    Anxiety state     dx x 10 years    Aseptic necrosis of head and neck of femur     Avascular/Aseptic Necrosis of Femoral Head-right foot; developed after bunion surgery    Asthma 2009    Running induced asthma/brachiospasms    Bleeding disorder 2017    MTFHR/Prothrombin mutations. 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; no h/o DVTs/PEs; strong family h/o DVTs; w/u resulted in MTFHR and prothrombin gene mutation    Dysmenorrhea 2008    PCOS    Dyspareunia 2019    Family history of breast cancer 02/14/2024    Food allergy 1990    [place]- anyphylaxis    Gout     Hearing loss     Hypothyroidism     Hypothyroidism-2014    Meniere's disease, unspecified ear     Migraine headache 2020    Prism in glasses has eliminated majority of migraines    Myopia     Myopia of both eyes    Osteoarthrosis, unspecified whether generalized or localized     Other chronic nonalcoholic liver disease     :Fatty liver disease, nonalcoholic-2014 - seen on u/s; rhem in  [place] reports that there was hepatocellular disease; hepatitis w/u is wnl    Polycystic ovaries     Primary hypercoagulable state     Prothrombin Gene Mutation-2017; no h/o DVTs/PEs; strong family h/o DVTs; w/u resulted in MTFHR and prothrombin gene mutation    Restless leg     Rheumatoid arthritis     Rheumatoid arthritis flare    Seasonal allergies 2008    All seasons. Take zyrtex and azalastine nasal spray    Sicca syndrome     Sjogren's syndrome-2017    Sleep apnea, unspecified        Past Surgical History:   Procedure Laterality Date    BUNIONECTOMY      ELBOW SURGERY      EXCISION TURBINATE, SUBMUCOUS  2012    KNEE SURGERY      NASAL SEPTUM SURGERY  2012    SINUS SURGERY      WISDOM TOOTH EXTRACTION Bilateral        Family History   Problem Relation Name Age of Onset    Breast cancer Mother Symone Amezcua     Osteoarthritis Mother Symone Amezcua         Spinal and psoriatic    Cancer Mother Symone Amezcua         Triple Positive Invasive Ductal Carcinoma of Left Breast    Diabetes Mother Symone Amezcua         Pre-diabetic    Hyperlipidemia Mother Symone Amezcua     Hypertension Mother Symone Amezcua     Rashes / Skin problems Mother Symone Amezcua         Psoriasis and psoriatic arthritis    Thyroid disease Mother Symone Amezcua         Hypothyroidism    Clotting disorder Mother Symone Amezcua         Prothrombin    Cancer Father Venkat Amezcua         Duodenal ( 2020)    Diabetes Father Venkat Amezcua     Hyperlipidemia Father Venkat Amezcua     Hypertension Father Venkat Amezcua     Thyroid disease Maternal Grandmother Karli Joy         Hypothyroidism    Clotting disorder Maternal Grandmother Karli Joy          from blood clotting including all family members on maternal side    Cancer Paternal Grandfather Tobi Ordazanaelizabeth         Leukemia    Cancer Paternal Grandmother Janell Amezcua         Colon and Lymphoma    Cancer Maternal Uncle Mariano Joy         Sinus    Cancer Maternal Aunt Elsie Lopez          "Thyroid cancer and removal    Thyroid disease Maternal Aunt Elsie Lopez         Thyroid cancer and removal       Review of patient's allergies indicates:   Allergen Reactions    Codeine Diarrhea, Nausea And Vomiting, Other (See Comments), Palpitations, Shortness Of Breath and Tinitus    Stover oil Anaphylaxis    Flexeril [cyclobenzaprine] Other (See Comments)    Hydrocodone-acetaminoph-supp11 Diarrhea       Social History[1]    Physical Exam   Vitals:  /69   Pulse 83   Temp 97.7 °F (36.5 °C) (Temporal)   Resp 20   Ht 5' 7" (1.702 m)   Wt 116.1 kg (255 lb 15.3 oz)   SpO2 98%   BMI 40.09 kg/m²       Physical Exam  Constitutional:       General: She is not in acute distress.     Appearance: She is not ill-appearing, toxic-appearing or diaphoretic.   HENT:      Head: Normocephalic and atraumatic.   Eyes:      Extraocular Movements: Extraocular movements intact.      Conjunctiva/sclera: Conjunctivae normal.   Cardiovascular:      Rate and Rhythm: Normal rate.   Pulmonary:      Effort: Pulmonary effort is normal.   Neurological:      Mental Status: She is alert and oriented to person, place, and time.   Psychiatric:         Mood and Affect: Mood normal.         Behavior: Behavior normal.         Thought Content: Thought content normal.        Labs   Labs:  No visits with results within 2 Day(s) from this visit.   Latest known visit with results is:   Lab Visit on 07/01/2025   Component Date Value Ref Range Status    Iron Level 07/01/2025 53  30 - 160 ug/dL Final    Transferrin 07/01/2025 284  200 - 375 mg/dL Final    Iron Binding Capacity Total 07/01/2025 420  250 - 450 ug/dL Final    Iron Saturation 07/01/2025 13 (L)  20 - 50 % Final    Ferritin 07/01/2025 17.0 (L)  20.0 - 300.0 ng/mL Final    WBC 07/01/2025 5.83  3.90 - 12.70 K/uL Final    RBC 07/01/2025 4.90  4.00 - 5.40 M/uL Final    HGB 07/01/2025 13.9  12.0 - 16.0 gm/dL Final    HCT 07/01/2025 41.4  37.0 - 48.5 % Final    MCV 07/01/2025 85  82 - 98 fL " Final    MCH 07/01/2025 28.4  27.0 - 31.0 pg Final    MCHC 07/01/2025 33.6  32.0 - 36.0 g/dL Final    RDW 07/01/2025 12.5  11.5 - 14.5 % Final    Platelet Count 07/01/2025 255  150 - 450 K/uL Final    MPV 07/01/2025 9.4  9.2 - 12.9 fL Final    Nucleated RBC 07/01/2025 0  <=0 /100 WBC Final    Neut % 07/01/2025 65.9  38 - 73 % Final    Lymph % 07/01/2025 25.6  18 - 48 % Final    Mono % 07/01/2025 5.7  4 - 15 % Final    Eos % 07/01/2025 2.1  <=8 % Final    Basophil % 07/01/2025 0.5  <=1.9 % Final    Imm Grans % 07/01/2025 0.2  0.0 - 0.5 % Final    Neut # 07/01/2025 3.85  1.8 - 7.7 K/uL Final    Lymph # 07/01/2025 1.49  1 - 4.8 K/uL Final    Mono # 07/01/2025 0.33  0.3 - 1 K/uL Final    Eos # 07/01/2025 0.12  <=0.5 K/uL Final    Baso # 07/01/2025 0.03  <=0.2 K/uL Final    Imm Grans # 07/01/2025 0.01  0.00 - 0.04 K/uL Final    Mild elevation in immature granulocytes is non specific and can be seen in a variety of conditions including stress response, acute inflammation, trauma and pregnancy. Correlation with other laboratory and clinical findings is essential.      Assessment and Plan   Iron Deficiency  Fatigue  Likely secondary to chronic blood loss from menorrhagia  Iron deficit:  528 mg  Given that she is responding to p.o. iron I recommend that she continue this; we discussed that her mild iron-deficiency does not explain the severity of her fatigue in the improvement that she has seen in her fatigue has likely been reached as she is not anemic.  She does have a history of hematuria so we will recheck a UA as well as check some other vitamin studies.  Given the severity of her fatigue we will conduct pan imaging to assess for any tumors/lesions.  We also discussed her plethora of symptoms and any possibility of an underlying hormonal or metabolic imbalance given she does have history of hypothyroidism and PCOS.  She has previously inquired about endocrinology evaluation and so I will get her referred.      Cancer  Screening  MMG 06/03/2025: BI-RADS Category 1: Negative   PAP Smear 10/02/2024:  NEGATIVE FOR INTRAEPITHELIAL LESION OR MALIGNANCY.   Colonoscopy and EGD 2019: patient reports hemorrhoids and possible fisure      Chronic Medical Conditions  Asthma  PCOS  Hx of Nephrolithiasis  Dysautonomia  Hx Hypothyroidism not on therapy   POTS  Hx RA  Sjogren's Syndrome   Sicca Syndrome   ITALO  Michael Danlos Syndrome, Hypermobile Type   IBS  POTS  RLS  Anxiety  Non-restorative sleep  Fatty Liver  ADHD  MTHFR/Prothrombin Gene Mutation        Med Onc Chart Routing      Follow up with physician 4 weeks. review labs - offer virtual   Follow up with KERRY    Infusion scheduling note    Injection scheduling note    Labs Urinalysis and other   Scheduling:  Preferred lab:  Lab interval:  other: All labs ordered by Dr. Estrada   Imaging CT chest abdomen pelvis      Pharmacy appointment    Other referrals         Additional referrals needed  External referral to endocrinology         > 45 minutes was spent in consultation with the patient, chart review (including labs, imaging, and pathology).    The patient was seen, interviewed and examined. Pertinent lab and radiologic studies were reviewed. Pt instructed to call should they develop concerning signs/symptoms or have further questions.        Portions of the record may have been created with voice recognition software. Occasional wrong-word or sound-a-like substitutions may have occurred due to the inherent limitations of voice recognition software. Read the chart carefully and recognize, using context, where substitutions have occurred.      Glenna Estrada MD    Hematology/Oncology              [1]   Social History  Tobacco Use    Smoking status: Never    Smokeless tobacco: Never

## 2025-07-17 ENCOUNTER — HOSPITAL ENCOUNTER (OUTPATIENT)
Dept: RADIOLOGY | Facility: HOSPITAL | Age: 35
Discharge: HOME | End: 2025-07-17
Attending: INTERNAL MEDICINE
Payer: COMMERCIAL

## 2025-07-17 DIAGNOSIS — R53.83 FATIGUE, UNSPECIFIED TYPE: ICD-10-CM

## 2025-07-17 DIAGNOSIS — D50.0 IRON DEFICIENCY ANEMIA DUE TO CHRONIC BLOOD LOSS: ICD-10-CM

## 2025-07-17 PROCEDURE — A9698 NON-RAD CONTRAST MATERIALNOC: HCPCS | Performed by: INTERNAL MEDICINE

## 2025-07-17 PROCEDURE — 71260 CT THORAX DX C+: CPT | Mod: 26,,, | Performed by: RADIOLOGY

## 2025-07-17 PROCEDURE — 25500020 PHARM REV CODE 255: Performed by: INTERNAL MEDICINE

## 2025-07-17 PROCEDURE — 71260 CT THORAX DX C+: CPT | Mod: TC

## 2025-07-17 PROCEDURE — 74177 CT ABD & PELVIS W/CONTRAST: CPT | Mod: 26,,, | Performed by: RADIOLOGY

## 2025-07-17 RX ADMIN — IOHEXOL 1000 ML: 12 SOLUTION ORAL at 07:07

## 2025-07-17 RX ADMIN — IOHEXOL 100 ML: 350 INJECTION, SOLUTION INTRAVENOUS at 08:07

## 2025-08-04 ENCOUNTER — OFFICE VISIT (OUTPATIENT)
Dept: DERMATOLOGY | Facility: CLINIC | Age: 35
End: 2025-08-04
Payer: COMMERCIAL

## 2025-08-04 DIAGNOSIS — D22.9 NEVUS: ICD-10-CM

## 2025-08-04 DIAGNOSIS — L73.2 HIDRADENITIS SUPPURATIVA: Primary | ICD-10-CM

## 2025-08-04 DIAGNOSIS — L73.2 SUPPURATIVE HIDRADENITIS: ICD-10-CM

## 2025-08-04 DIAGNOSIS — L81.9 HYPERPIGMENTATION: ICD-10-CM

## 2025-08-04 DIAGNOSIS — L65.9 ALOPECIA: ICD-10-CM

## 2025-08-04 PROCEDURE — 1160F RVW MEDS BY RX/DR IN RCRD: CPT | Mod: CPTII,S$GLB,, | Performed by: DERMATOLOGY

## 2025-08-04 PROCEDURE — 1159F MED LIST DOCD IN RCRD: CPT | Mod: CPTII,S$GLB,, | Performed by: DERMATOLOGY

## 2025-08-04 PROCEDURE — G2211 COMPLEX E/M VISIT ADD ON: HCPCS | Mod: S$GLB,,, | Performed by: DERMATOLOGY

## 2025-08-04 PROCEDURE — 99204 OFFICE O/P NEW MOD 45 MIN: CPT | Mod: S$GLB,,, | Performed by: DERMATOLOGY

## 2025-08-04 PROCEDURE — 99999 PR PBB SHADOW E&M-EST. PATIENT-LVL IV: CPT | Mod: PBBFAC,,, | Performed by: DERMATOLOGY

## 2025-08-04 RX ORDER — CLINDAMYCIN 1 G/10ML
GEL TOPICAL
Qty: 75 ML | Refills: 6 | Status: SHIPPED | OUTPATIENT
Start: 2025-08-04

## 2025-08-04 RX ORDER — DOXYCYCLINE 100 MG/1
100 CAPSULE ORAL 2 TIMES DAILY
Qty: 60 CAPSULE | Refills: 2 | Status: SHIPPED | OUTPATIENT
Start: 2025-08-04

## 2025-08-04 RX ORDER — HYDROQUINONE 40 MG/G
CREAM TOPICAL
Qty: 30 G | Refills: 1 | Status: SHIPPED | OUTPATIENT
Start: 2025-08-04

## 2025-08-04 NOTE — PROGRESS NOTES
Subjective:      Patient ID:  Dhara Amezcua is a 34 y.o. female who presents for   Chief Complaint   Patient presents with    Hidradenitis Suppurativa     Referred from Allergy for HS    Patient complains of lesion(s)  Location: under breasts, upper inner thighs  Duration: 9 months  Symptoms: come and go; looks kind of like boils, drained pus and blood, some left scars  Relieving factors/Previous treatments:   Cleans them with iodine and hypochlorous foam    Laser hair removal on thighs (got bad ingrown hairs on thighs as a teen); having laser hair removal again as some hair is starting to come back and that is where she is getting some HS lesions    Notes she started getting HS lesions in November 2024 under the breasts and on inner thighs that flared when she came off azathioprine    Strict anti inflammatory diet since January and notes since then she has not flared much    Reports her rheumatologist believes she has an undiagnosed seronegative psoriatic arthritis, thinks she may have inverse psoriasis symptoms on inner thighs that occurred a few months ago, did not itch (not present currently; has photo of scaly patch R inguinal fold)    Notes rough spots on tops of hands, not itchy, not present today      Also notes hair thinning for many years, frontal scalp, top of scalp, prior ILK sessions in 2021 and Rogaine no change,    Med hx: Iron deficiency, Sjogren's Syndrome,  Asthma, PCOS, Hx of Nephrolithiasis, Dysautonomia, Hypothyroidism, POTS, Hx RA, Sjogren's Syndrome, Sicca Syndrome, ITALO, Michael Danlos Syndrome, IBS, POTS, RLS, anxiety, and non-restorative sleep.    Mom has h/o psoriasis and psoriatic arthritis      Also would like some moles looked at    Also notes some dark patches on skin mainly where bra strap rubs on chest, would like to try a skin lightener        Review of Systems   Skin:  Positive for rash. Negative for itching.       Objective:   Physical Exam   Constitutional: She appears  well-developed and well-nourished. No distress.   Neurological: She is alert and oriented to person, place, and time. She is not disoriented.   Psychiatric: She has a normal mood and affect.   Skin:   Areas Examined (abnormalities noted in diagram):   Scalp / Hair Palpated and Inspected  Head / Face Inspection Performed  Neck Inspection Performed  Chest / Axilla Inspection Performed  Abdomen Inspection Performed  Back Inspection Performed  RUE Inspected  LUE Inspection Performed  RLE Inspected  LLE Inspection Performed  Nails and Digits Inspection Performed                 Diagram Legend     Erythematous scaling macule/papule c/w actinic keratosis       Vascular papule c/w angioma      Pigmented verrucoid papule/plaque c/w seborrheic keratosis      Yellow umbilicated papule c/w sebaceous hyperplasia      Irregularly shaped tan macule c/w lentigo     1-2 mm smooth white papules consistent with Milia      Movable subcutaneous cyst with punctum c/w epidermal inclusion cyst      Subcutaneous movable cyst c/w pilar cyst      Firm pink to brown papule c/w dermatofibroma      Pedunculated fleshy papule(s) c/w skin tag(s)      Evenly pigmented macule c/w junctional nevus     Mildly variegated pigmented, slightly irregular-bordered macule c/w mildly atypical nevus      Flesh colored to evenly pigmented papule c/w intradermal nevus       Pink pearly papule/plaque c/w basal cell carcinoma      Erythematous hyperkeratotic cursted plaque c/w SCC      Surgical scar with no sign of skin cancer recurrence      Open and closed comedones      Inflammatory papules and pustules      Verrucoid papule consistent consistent with wart     Erythematous eczematous patches and plaques     Dystrophic onycholytic nail with subungual debris c/w onychomycosis     Umbilicated papule    Erythematous-base heme-crusted tan verrucoid plaque consistent with inflamed seborrheic keratosis     Erythematous Silvery Scaling Plaque c/w Psoriasis     See  annotation      Assessment / Plan:        Hidradenitis suppurativa  - agree could be c/w early HS stage 1, has single scar R thigh, and on strict anti inflammatory diet and undergoing laser hair reduction for the 2nd time which could be greatly controlling symptoms  -discussed etiology and chronic nature of condition, lack of cure, difficulty with treatment, and management options including environmental/lifestyle modifications (OTC cleansers, anti inflammatory supplements, weight loss, avoiding smoking, avoid friction/tight clothing; shaving avoidance/precautions, LHR), topical medications (start topical clindamycin), oral antibiotics for anti inflammatory effect, OCPs, spironolactone (could also help with hair loss however would be cautious given her h/o POTS/dysautonoma), and Cosentyx/Humira (given level of involvement would not consider biologic therapy solely for HS at this point)    -     CLINDAGEL 1 % glqd; AAA on groin, under breasts, underarms BID  Dispense: 75 mL; Refill: 6  -     doxycycline (MONODOX) 100 MG capsule; Take 1 capsule (100 mg total) by mouth 2 (two) times daily. For 2 weeks as needed for HS flares.  Take with food and water. Avoid lying down for 1 hour after taking. Avoid the sun.  Dispense: 60 capsule; Refill: 2    Dermatitis  - not present today, but photo could be consistent with inverse psoriasis.  If it flares, ok to overbook for biopsy if she desires    Hyperpigmentation  - possibly frictional. She would like to try hydroquinone. Discussed also SPF  -     hydroquinone 4 % Crea; Apply sparingly to affected areas of skin BID prn dark spots. Do not use for longer than 16 weeks.  Dispense: 30 g; Refill: 1    - hydroquinone: we reviewed that this is to be used for no more than 16 weeks of continuous use, and then take at least one month off. Discussed risk of ochronosis with uninterrupted use.    Nevus  Discussed ABCDE's of nevi.  Monitor for new mole or moles that are becoming bigger,  darker, irritated, or developing irregular borders    Alopecia  - suspect FPHL given miniaturized hairs; has not responded to ILK or topical Rogaine in the past; could consider spironolactone (see above)           Follow up in about 6 months (around 2/4/2026).    **47 minutes spent in counseling and coordination of care**      LOS NUMBER AND COMPLEXITY OF PROBLEMS    COMPLEXITY OF DATA RISK TOTAL TIME (m)   48835  [id number] [] 1 self-limited or minor problem [x] Minimal to none [] No treatment recommended or patient to monitor. Reassurance.  15-29  10-19   83124  [id number] Low  [] 2 or more self limited or minor problems  [] 1 stable chronic illness  [] 1 acute, uncomplicated illness or injury Limited (2)  [] Prior external notes from each unique source  [] Review result of each unique test  [] Order each unique test  OR [] Independent historian Low  []  OTC medications   []  Discussed/Decision for minor skin surgery (no risk factors) 30-44  20-29   70909  [id number] Moderate  [x]  1 or more chronic unstable illness (not at goal or progression or exacerbation) or SE of treatment  []  2 or more stable chronic illnesses  []  1 acute illness with systemic symptoms  []  1 acute complicated injury  []  1 undiagnosed new problem with uncertain prognosis Moderate (1/3 below)  []  3 or more data items        *Now includes independent historian  []  Independent interpretation of a test  []  Discuss management/test with another provider Moderate  [x]  Prescription drug mgmt  []  Discussed/Decision for Minor surgery with risk factors  []  Mgmt limited by social determinates 45-59  30-39   81376  72268 High  []  1 or more chronic illness with severe exacerbation, progression or SE of treatment  []  1 acute or chronic illness/injury that poses a threat to life or bodily function Extensive (2/3 below)  []  3 or more data items        *Now includes independent historian.  []  Independent interpretation of a test  []  Discuss management/test  with another provider High  []  Major surgery with risk discussed  []  Drug therapy requiring intensive monitoring for toxicity  []  Hospitalization  []  Decision for DNR 60-74  40-54

## 2025-08-04 NOTE — PATIENT INSTRUCTIONS
More info for hand dermatitis:  Hand washing:  Wash your hands with soap for the full recommended 20 seconds.  Wash every part of your hands including between your fingers and around your nails.    Use lukewarm water instead of hot water    Dry your hands with a clean towel or let them air dry  But leave them slightly damp  While your hands are slightly damp, apply a thick cream or ointment that:  Contains mineral oil or petrolatum  Comes in a tube or jar rather than a pump or bottle  Says it is fragrance-free and dye-free  When you use hand , apply your cream or ointment immediately after the  dries    Using moisturizer after sanitizing or washing your hands does NOT negate your hand washing efforts.  In fact, having a well-moisturized, intact skin barrier makes it HARDER for germs to penetrate your skin and get inside your body.    Since you are washing your hands frequently throughout the day, you should be applying your moisturizer just as often, if not more often, throughout the day.  It may help to keep a tube of moisturizer next to your soap or  as a reminder, as well in your purse or pocket.      Hand moisturizing creams to try:  Cutemol Emollient Dry Skin cream (online from [employer])  Neutrogena Norwegian Formula Hand cream  La Roche Posay Lipikar Balm   CeraVe cream  Cetaphil cream  Vanicream cream  O'Kefes Working Hands hand cream    Hand moisturizing ointments to try:  CeraVe Healing Ointment  Vanicream Vaniply Ointment  Aquaphor ointment (as long as you are not allergic to lanolin)    For hand fissures (painful cracks in the skin)  SuperGlue or KrazyGlue with brush tip (apply a small amount to give a watertight seal for a few days and allow it to heal from the inside)  Liquid bandage  Finger [place] with Neutrogena Norwegian Formula Hand Cream            HS:  TREATMENT REGIMEN:   ENVIRONMENTAL:  Dilute bleach baths or dilute bleach spray (Levicyn) or Vetrycin  "([employer])  Recipe for dilute bleach baths 2 times per week (or more often as needed) -- add 1/2 cup of regular strength (6%) bleach to a full tub of lukewarm water and soak for 10 - 15 minutes. (use 1/4 cup for a half-full tub of water).  Hibiclens wash and /or (can alternate) benzoyl peroxide wash to affected areas daily. If sensitive skin, try CLN cleanser daily  Weight loss: Low glycemic index diet/no sugar/no dairy OR paleo diet OR Keto diet (look up sugar busters or zone diet)  50 - 75% of HS pts are obese  wt loss can decrease risk for disease progression  obesity leads to increased friction which exacerbates disease  Keep food journal  Wear loose fitting clothing - friction exacerbates disease  Stop shaving where you have breakouts - friction exacerbates disease; consider laser hair removal  Keep skin cool - overheating and sweating can flare HS; consider Robinul  Warm/hot compress for home use on a painful deep lump  Hydrogen Peroxide and medihoney can decrease odor  HS specific wound dressing system:  HidraWear - Wound care solution, adhesive free for hidradenitis suppurativa      SUPPLEMENTS (anti-inflammatory):  Turmeric - start with 500mg every day and increase to 1 g every day (may cause GI upset)- 100x more anti-inflammatory if mixed with black pepper or with fatty food  V8 juice - (contains zinc and Vit C) - 3 small cans/week  Zinc gluconate 90 mg a day (watch for GI upset; can try 30 mg three times a day instead if this occurs)  Vitamin D3 - additional study needed        Patient support:     More information is available online from these organizations:  ?The ?S Foundation (www.hs-foundation.org)  ?Hope for ?S (www.hopeforhs.org)  ??? Connect ([email or web address])        What is hidradenitis suppurativa?   ?i?r???nitis suppurativa, or "??," is a condition that causes swollen, painful areas of skin. The most common body parts for H? are the armpits, but ?S can happen in other body areas.  H? is not an " "infection. But sometimes, skin with ?? can get infected.    What are the symptoms of HS?   The main symptoms are swollen, painful, abscess-like areas on the skin. These often last for weeks or months and keep coming back. Smaller areas of HS can sometimes look like a type of skin infection called a "boil."  Skin with H? can drain pus or blood that has an unpleasant smell. Also, HS can cause so much pain that it makes it hard to move.    Common body locations include:  ?Armpits   ?On or under the breasts  ?Groin area  ?Inner thighs  ?Buttocks  ?Around or near the anus  The skin problems caused by ?? last a long time and get worse over time. Often, the skin hardens and scars around the swollen areas. Sometimes, tunnels form under the skin     Should I see a doctor or nurse?   Yes. If you have symptoms of HS, see a doctor or nurse. They might be able to tell if you have ??. Or they might send you to a doctor that can figure out if ?? is the cause of your symptoms. If possible, it can help to see a skin doctor, called a "dermatologist." The ?? Foundation also has a list of specialty clinics on their website: www.hs-foundation.org/us-[place]dhnzma[place].  Many people have a hard time dealing with their ??. It can make them feel embarrassed and worried. Sometimes, the condition can even cause problems in relationships or at work. Tell your doctor or nurse if you are struggling. They can help.  It's also important to see your doctor regularly if you have ??. People with ?S have a higher chance of getting other health problems, too, such as diabetes and heart disease. Your doctor can check for these problems and suggest treatment if needed.    How is HS treated?   Some treatments include:  ?Antibiotic pills, which you might need to take for a few months or longer  ?Other pills (examples include birth control pills, spironolactone, and metformin)  ?"Biologic" medicines (examples include adalimumab, secukinumab, and " infliximab)  ?Injections of steroid medicines to bring down inflammation  ?Surgery  ?Medicines you put on your skin    There are other medicines and treatments that might help people with H?. People with severe ?? can have a type of surgery that removes the skin with ??.    Is there anything I can do on my own to feel better?   Yes. First, know that you did not do anything to cause your condition. It is not your fault. You did not cause it by being unclean. Also, ?? is not contagious, and you cannot spread it to other people.  If you are feeling sad or depressed, it might help to get support. Talk to your doctor or nurse. For some people, seeing a therapist or finding a support group might help. The H? Foundation has more information: www.hs-foundation.org/support-groups.  Some doctors think that stopping smoking (if you smoke) and losing weight (if you have excess body weight) might help ??. But there isn't a lot of evidence about this.          An article written from the perspective of a patient with HS:    INTRODUCTION   This topic was written by an individual patient diagnosed with hi?r??eniti? suppurativa (?S). It is intended to offer clinicians insight into the experience of a single individual from that individual's point of view, which may enhance clinical practice and health care delivery. This description of a particular patient's experience is not intended to be comprehensive or to provide recommendations regarding diagnosis, treatment, and/or medication information. It is not intended to be medical advice or to be a substitute for the medical advice, diagnosis, or treatment of a health care provider based on the health care provider's examination and assessment of a patient's specific and unique circumstances.    BACKGROUND   From the onset of puberty, I can recall pimples in the areas of my body where I sweat, particularly my underwear line, armpits, and inner thighs. The pimples were small but  noticeable. Sometimes they would come back in the same area, and sometimes they wouldn't. There was nothing distinct about them, nothing to make me think they were anything other than your standard pimple with a tristan and sometimes a blackhead. They certainly weren't anything to make me think I needed to seek medical treatment.  When I was 19 years old, I developed an abscess in my buttocks that was so painful that I had to seek medical care. I was told that I had a perineal abscess and was sent to an outpatient surgery center for an incision and drainage. I found the procedure to be awkward and unpleasant, so much so that I had a list of questions prepared for the postoperative follow-up appointment. When I asked specifically what caused perineal abscesses to happen and what could I do to prevent them from happening, I was mistakenly told that they were caused by poor hygiene and that if I wanted to prevent them from happening, I needed to take better care of myself. I was told to shower twice daily and to not scratch myself.  I left the follow-up appointment mortified and even more embarrassed than I was before. I truly believed that I had a hygiene problem. I would go on to spend the next eight years too embarrassed to return to any doctor, and instead meticulously analyzed my hygiene. I changed everything from the products I used on my body to my laundry detergent. I questioned if I was allergic to my cats. I wondered if it was my diet. I tried numerous drugstore products to treat acne, but nothing helped. In fact, these pimples continued to spread and get worse.    DIAGNOSIS AND TREATMENT   Initial experiences -- I was diagnosed with ?S at an urgent care appointment. I had gone there because I saw a poster in a hospital elevator about methicillin-resistant Staphylococcus aureus (MRSA). I was given a printout about HS and told that I needed to see a dermatologist. When I saw the dermatologist, I was sent  home with a prescription that would subsequently fail to improve my symptoms.    A year after that initial appointment, I returned to see if there were any other treatment options. The ?? had significantly spread since that first appointment, so the dermatologist referred me to a clinical trial. I would spend the next three years in that trial until I voluntarily withdrew, as I was trying to start a family. After that, I chose to see a functional medicine doctor who prescribed additional treatments that did not help as well.    There were two more dermatologists whom I went on to see until I landed at the one I see now. Those two dermatologists prescribed me a variety of treatments. However, I was given little explanation for the medications I was prescribed. The lack of communication was frustrating. My H? was advanced at this point, and I was in severe pain. I tried the therapies they recommended because I wanted to get better, but I never had confidence in any of them.    Current plan  Treatment experience -- My current dermatologist has explained all the possible outcomes for the treatments I am receiving and where she hopes I'll get to. I am consistently made to feel comfortable expressing my concerns and end goals for treatment.  Her determination to treat this disease matches my determination to get better. It has made those dark days when I felt so hopeless a distant memory. When I am in pain, I have a valuable resource to help me. When there are upticks in activity with my ?S, I have a safe place to vocalize my concerns and means to combat that activity. With the care I am under now, I feel like I am part of a team who shares the same goal, and I can't express enough how valuable of a resource that is.  Not only is my disease well managed now, but I am also confident and comfortable with my health care team.    Communication -- My current health care team incorporates the core concepts of patient- and  family-centered care where applicable. I am a  adult with no immediate family involved in my care, so communication is between myself and my health care team only.  I am made to feel involved and informed about my health care. I feel respected, and it is clear to me that all my questions are valued.  I have been made aware of all avenues of communication and can easily contact someone on my health care team with questions or if I need an emergency appointment because of an HS flare.  I do not feel pressured into making decisions, nor do I feel bad about asking questions. I am consistently directed towards resources if no one is available to immediately provide them for me.    IMPACT OF HIDRADENITIS SUPPURATIVA ON MY LIFE   ?Daily life - ?S impacts every single aspect of my life. When this disease was at its worst, I had trouble sleeping, walking, performing household chores, showering, taking care of myself, and performing essential duties of my job. I was both depressed and anxious.  Depression came in the form of hopelessness to stop it and frustration with my body's lack of ability to do the things I'd wanted it to do. Anxiety was brought on by both the unpredictable nature of ?S and the fear that people would find out I had it and place judgment and stigma on me.  Hiding this disease from society was a very real part of my experience for a very long time. I have misrepresented my symptoms to coworkers in the past and took due diligence to hide drainage. There were many times that I refused to sit down in public places for fear of leaking onto seats.  ?Personal relationships - There were impacts on my family relationships as well. Misunderstanding of ?? symptoms caused disagreements and poor communication. Because I was initially told that my symptoms were related to hygiene, it was ingrained in me that this was my fault, and I would project that onto my personal relationships by not explaining thoroughly  what I was going through or experiencing. I was afraid they would think it was my fault too and be even more upset with me.  ?Financial burden - The financial burden of ?? is very real and worth discussion. The cost of daily wound care, body washes, and products to treat the chronic nature of this disease add up.  ?Positive changes - There have been positive changes, however, in the last few years. I am more confident in my body of knowledge surrounding this disease and because of that, I feel more comfortable explaining it to people in and out of my [place]. I have also accepted that I am not to blame for this disease and now work diligently to help other patients understand the same thing.    RESOURCES   There are so many good organizations that now exist to help the patient community. The HS Foundation provides resources for both providers and patients, works to fund important research, and hosts various educational events throughout the year. [employer] for HS provides advocacy, support, and education to the patient community via in-person and virtual support groups and advocacy initiatives. HS Connect provides resources and education for patients. The International Association of Hidradenitis Suppurativa Networks aims to provide advocacy and awareness for patients.    FINAL THOUGHTS   The road to well-managed ?? has taken me nearly 20 years. I wish there was more optimism and less misunderstanding in the beginning of my journey. I really wish it had been ingrained in me from the beginning that ?S is an immune response and not something that I'm doing to myself. Carrying the blame for such a painful and debilitating disease wreaked havoc on my mental health for years.  I want patients now to understand that HS is a medical condition that requires medical care. It is not something we bring upon ourselves. It is important to seek information through credible and validated sources. Because of the areas of the body that H?  affects, it is important to feel safe, not judged, and most importantly, confident in the health care team.